# Patient Record
Sex: MALE | Race: WHITE | NOT HISPANIC OR LATINO | Employment: OTHER | URBAN - METROPOLITAN AREA
[De-identification: names, ages, dates, MRNs, and addresses within clinical notes are randomized per-mention and may not be internally consistent; named-entity substitution may affect disease eponyms.]

---

## 2019-12-04 ENCOUNTER — TELEPHONE (OUTPATIENT)
Dept: FAMILY MEDICINE | Facility: CLINIC | Age: 64
End: 2019-12-04

## 2019-12-04 DIAGNOSIS — D64.9 ANEMIA, UNSPECIFIED TYPE: Primary | ICD-10-CM

## 2019-12-04 DIAGNOSIS — E78.5 HYPERLIPIDEMIA, UNSPECIFIED HYPERLIPIDEMIA TYPE: Primary | ICD-10-CM

## 2019-12-04 NOTE — TELEPHONE ENCOUNTER
----- Message from Jackeline Fernandez sent at 12/4/2019  4:13 PM CST -----  Contact: pt  Voicemail:(  took message / and I am sending message back b/c she wasn't sure which pt was correct.. B/c they had had same b day.)    He said he had a new pharmacy in message, and she tried calling back but couldn't reach him      Pt needs refill on Lipitor    [phone]

## 2019-12-04 NOTE — TELEPHONE ENCOUNTER
----- Message from Lety England sent at 12/4/2019  4:41 PM CST -----  Contact: [facility] - Patient requesting refills of Lipitor to [facility] on [address] in [place].  Patient is also wanting to do a CBC tomorrow here with [facility].  [phone]

## 2019-12-05 RX ORDER — ATORVASTATIN CALCIUM 20 MG/1
20 TABLET, FILM COATED ORAL DAILY
Qty: 30 TABLET | Refills: 5 | Status: SHIPPED | OUTPATIENT
Start: 2019-12-05 | End: 2019-12-16 | Stop reason: SDUPTHER

## 2019-12-16 DIAGNOSIS — E78.5 HYPERLIPIDEMIA, UNSPECIFIED HYPERLIPIDEMIA TYPE: ICD-10-CM

## 2019-12-16 RX ORDER — ATORVASTATIN CALCIUM 20 MG/1
20 TABLET, FILM COATED ORAL DAILY
Qty: 30 TABLET | Refills: 5 | Status: SHIPPED | OUTPATIENT
Start: 2019-12-16 | End: 2021-04-06 | Stop reason: SDUPTHER

## 2019-12-16 RX ORDER — PAROXETINE HYDROCHLORIDE HEMIHYDRATE 37.5 MG/1
37.5 TABLET, FILM COATED, EXTENDED RELEASE ORAL EVERY MORNING
Qty: 30 TABLET | Refills: 5 | Status: SHIPPED | OUTPATIENT
Start: 2019-12-16 | End: 2020-03-24 | Stop reason: SDUPTHER

## 2019-12-16 NOTE — TELEPHONE ENCOUNTER
----- Message from Alejo Grady sent at 12/16/2019  2:40 PM CST -----  Pt is needing his Lipitor [facility] [address] [facility] [place] and also [facility]   Pt [phone]

## 2019-12-17 LAB
BASOPHILS # BLD AUTO: 21 CELLS/UL (ref 0–200)
BASOPHILS NFR BLD AUTO: 0.4 %
EOSINOPHIL # BLD AUTO: 10 CELLS/UL (ref 15–500)
EOSINOPHIL NFR BLD AUTO: 0.2 %
ERYTHROCYTE [DISTWIDTH] IN BLOOD BY AUTOMATED COUNT: 12 % (ref 11–15)
HCT VFR BLD AUTO: 45 % (ref 38.5–50)
HGB BLD-MCNC: 15.3 G/DL (ref 13.2–17.1)
LYMPHOCYTES # BLD AUTO: 1331 CELLS/UL (ref 850–3900)
LYMPHOCYTES NFR BLD AUTO: 25.6 %
MCH RBC QN AUTO: 31.5 PG (ref 27–33)
MCHC RBC AUTO-ENTMCNC: 34 G/DL (ref 32–36)
MCV RBC AUTO: 92.8 FL (ref 80–100)
MONOCYTES # BLD AUTO: 442 CELLS/UL (ref 200–950)
MONOCYTES NFR BLD AUTO: 8.5 %
NEUTROPHILS # BLD AUTO: 3396 CELLS/UL (ref 1500–7800)
NEUTROPHILS NFR BLD AUTO: 65.3 %
PLATELET # BLD AUTO: 211 THOUSAND/UL (ref 140–400)
PMV BLD REES-ECKER: 11.1 FL (ref 7.5–12.5)
RBC # BLD AUTO: 4.85 MILLION/UL (ref 4.2–5.8)
WBC # BLD AUTO: 5.2 THOUSAND/UL (ref 3.8–10.8)

## 2019-12-18 NOTE — TELEPHONE ENCOUNTER
----- Message from Alejo Grady sent at 12/18/2019  2:35 PM CST -----  Pt son passed and he is needing something to help with his sons passing needing a call back   Pt [phone]

## 2019-12-18 NOTE — TELEPHONE ENCOUNTER
Per Dr. Guzman - Xanax .5mg BID #20. OV if needed. Spoke with pt and let him know the above. Agrees to call back if needed.  [facility] [place]

## 2019-12-19 DIAGNOSIS — F41.9 ANXIETY: Primary | ICD-10-CM

## 2019-12-19 RX ORDER — ALPRAZOLAM 0.5 MG/1
0.5 TABLET ORAL 2 TIMES DAILY PRN
Qty: 20 TABLET | Refills: 0 | Status: SHIPPED | OUTPATIENT
Start: 2019-12-19 | End: 2020-03-24 | Stop reason: SDUPTHER

## 2019-12-19 RX ORDER — ALPRAZOLAM 0.5 MG/1
0.5 TABLET ORAL 2 TIMES DAILY PRN
Qty: 20 TABLET | Refills: 0 | Status: SHIPPED | OUTPATIENT
Start: 2019-12-19 | End: 2019-12-19 | Stop reason: SDUPTHER

## 2019-12-19 NOTE — TELEPHONE ENCOUNTER
----- Message from Asuncion Ramirez sent at 12/19/2019  2:26 PM CST -----  Refill for Xanax went to the wrong pharmacy. Please cancel the RX to [facility]. Send the RX to [facility] [address], [place]. Pt #[phone]

## 2019-12-31 ENCOUNTER — TELEPHONE (OUTPATIENT)
Dept: FAMILY MEDICINE | Facility: CLINIC | Age: 64
End: 2019-12-31

## 2019-12-31 NOTE — TELEPHONE ENCOUNTER
----- Message from Lisa Randle NP sent at 12/31/2019  3:07 PM CST -----  Labs are within normal limits.

## 2020-01-02 NOTE — TELEPHONE ENCOUNTER
LMOR again. Checked [facility] and there was a different number. Called that and spoke to patient with lab results. Updated number in chart.

## 2020-03-24 ENCOUNTER — TELEPHONE (OUTPATIENT)
Dept: FAMILY MEDICINE | Facility: CLINIC | Age: 65
End: 2020-03-24

## 2020-03-24 ENCOUNTER — OFFICE VISIT (OUTPATIENT)
Dept: FAMILY MEDICINE | Facility: CLINIC | Age: 65
End: 2020-03-24
Payer: MEDICARE

## 2020-03-24 DIAGNOSIS — F41.9 ANXIETY: Primary | ICD-10-CM

## 2020-03-24 DIAGNOSIS — Z12.11 COLON CANCER SCREENING: ICD-10-CM

## 2020-03-24 DIAGNOSIS — R19.5 ABNORMAL FECES: ICD-10-CM

## 2020-03-24 DIAGNOSIS — K64.9 HEMORRHOIDS, UNSPECIFIED HEMORRHOID TYPE: ICD-10-CM

## 2020-03-24 DIAGNOSIS — F32.A DEPRESSION, UNSPECIFIED DEPRESSION TYPE: ICD-10-CM

## 2020-03-24 PROCEDURE — 99213 OFFICE O/P EST LOW 20 MIN: CPT | Mod: 95,,, | Performed by: NURSE PRACTITIONER

## 2020-03-24 PROCEDURE — 99213 PR OFFICE/OUTPT VISIT, EST, LEVL III, 20-29 MIN: ICD-10-PCS | Mod: 95,,, | Performed by: NURSE PRACTITIONER

## 2020-03-24 RX ORDER — HYDROCORTISONE 25 MG/G
CREAM TOPICAL 2 TIMES DAILY
Qty: 28 G | Refills: 0 | Status: SHIPPED | OUTPATIENT
Start: 2020-03-24 | End: 2020-06-18

## 2020-03-24 RX ORDER — ALPRAZOLAM 0.5 MG/1
0.5 TABLET ORAL 2 TIMES DAILY PRN
Qty: 20 TABLET | Refills: 0 | Status: SHIPPED | OUTPATIENT
Start: 2020-03-24 | End: 2020-06-18

## 2020-03-24 RX ORDER — DOCUSATE SODIUM 100 MG/1
100 CAPSULE, LIQUID FILLED ORAL 2 TIMES DAILY
Qty: 60 CAPSULE | Refills: 0 | Status: SHIPPED | OUTPATIENT
Start: 2020-03-24 | End: 2020-06-18

## 2020-03-24 RX ORDER — PAROXETINE HYDROCHLORIDE HEMIHYDRATE 37.5 MG/1
37.5 TABLET, FILM COATED, EXTENDED RELEASE ORAL EVERY MORNING
Qty: 30 TABLET | Refills: 5 | Status: SHIPPED | OUTPATIENT
Start: 2020-03-24 | End: 2020-10-05 | Stop reason: SDUPTHER

## 2020-03-24 NOTE — TELEPHONE ENCOUNTER
"----- Message from Lesli Tejada MA sent at 3/24/2020  8:18 AM CDT -----  Pt would like request a Cologuard test kit.  He states he is having some GI issues.  Symptoms:  Stool is narrow and "ribboning" and reports pain with BM.  He read "online" that this kit would help.    Pt - [phone]  "

## 2020-03-24 NOTE — PROGRESS NOTES
"    Subjective:        The chief complaint leading to consultation is: to discuss changes in bowel function  The patient location is:  home  Visit type: Virtual visit with synchronous audio and video    64-year-old male presents for virtual visit to discuss changes in bowel habits.  Reports that for the last 3 weeks bowel movement seem " thinner.  Last colonoscopy was about 2 years ago.  No polyps were found  No abdominal pain.  Does have to strain at times and feels constipated.  Not sure if has hemorrhoids but does report previous history of hemorrhoids.  No bleeding.  Appetite is normal.  No recent weight loss.    Patient would like to discuss anxiety and depression.  Reports that son passed away late last year and still grieving.  Dr. Guzman prescribe Xanax and did help some currently taking Paxil but admits not taken regularly.  Started grief counseling as a group but does not feel comfortable because he is the only male in the group.  Sleeping okay.  Not currently working.  No suicidal or homicidal ideations.      No past surgical history on file.  Past Medical History:   Diagnosis Date    Hyperlipidemia      No family history on file.     Social History:   Marital Status:   Alcohol History:  has no alcohol history on file.  Tobacco History:  has no tobacco history on file.  Drug History:  has no drug history on file.    Review of patient's allergies indicates:  No Known Allergies    Current Outpatient Medications   Medication Sig Dispense Refill    ALPRAZolam (XANAX) 0.5 MG tablet Take 1 tablet (0.5 mg total) by mouth 2 (two) times daily as needed for Anxiety. 20 tablet 0    atorvastatin (LIPITOR) 20 MG tablet Take 1 tablet (20 mg total) by mouth once daily. 30 tablet 5    docusate sodium (COLACE) 100 MG capsule Take 1 capsule (100 mg total) by mouth 2 (two) times daily. 60 capsule 0    hydrocortisone 2.5 % cream Apply topically 2 (two) times daily. 28 g 0    metoclopramide HCl (REGLAN) 10 MG " tablet Take 1 tablet (10 mg total) by mouth every 6 (six) hours as needed (nausea). 30 tablet 0    PARoxetine HCl 37.5 mg 24 hr tablet Take 37.5 mg by mouth every morning. 30 tablet 5     No current facility-administered medications for this visit.        Review of Systems   Constitutional: Negative for fatigue, fever and unexpected weight change.   HENT: Negative for ear pain, sinus pressure and sore throat.    Eyes: Negative for pain.   Respiratory: Negative for cough and shortness of breath.    Cardiovascular: Negative for chest pain and leg swelling.   Gastrointestinal: Positive for constipation. Negative for abdominal pain, nausea and vomiting.   Genitourinary: Negative for dysuria, frequency and urgency.   Musculoskeletal: Negative for arthralgias.   Skin: Negative for rash.   Neurological: Negative for dizziness, weakness and headaches.   Psychiatric/Behavioral: Negative for self-injury, sleep disturbance and suicidal ideas. The patient is nervous/anxious.          Objective:        Physical Exam:   Physical Exam   Constitutional: He appears well-developed and well-nourished. No distress.   Psychiatric: His speech is normal and behavior is normal. Thought content normal. Cognition and memory are normal. He exhibits a depressed mood.            Assessment:       1. Anxiety    2. Depression, unspecified depression type    3. Hemorrhoids, unspecified hemorrhoid type    4. Abnormal feces    5. Colon cancer screening      Plan:   Anxiety  Comments:  Discussed importance of taking Paxil daily as prescribed  Orders:  -     ALPRAZolam (XANAX) 0.5 MG tablet; Take 1 tablet (0.5 mg total) by mouth 2 (two) times daily as needed for Anxiety.  Dispense: 20 tablet; Refill: 0    Depression, unspecified depression type  Comments:  Discussed grief counseling patient declines at this time.    Hemorrhoids, unspecified hemorrhoid type    Abnormal feces    Colon cancer screening  -     Cologuard Screening (Multitarget Stool DNA);  Future; Expected date: 03/24/2020    Other orders  -     PARoxetine HCl 37.5 mg 24 hr tablet; Take 37.5 mg by mouth every morning.  Dispense: 30 tablet; Refill: 5  -     docusate sodium (COLACE) 100 MG capsule; Take 1 capsule (100 mg total) by mouth 2 (two) times daily.  Dispense: 60 capsule; Refill: 0  -     hydrocortisone 2.5 % cream; Apply topically 2 (two) times daily.  Dispense: 28 g; Refill: 0      Follow up in about 2 weeks (around 4/7/2020) for medication management.    Total time spent with patient: 20min    Each patient to whom he or she provides medical services by telemedicine is:  (1) informed of the relationship between the physician and patient and the respective role of any other health care provider with respect to management of the patient; and (2) notified that he or she may decline to receive medical services by telemedicine and may withdraw from such care at any time.    This note was created using [employer] voice recognition software that occasionally misinterprets phrases or words.

## 2020-04-20 ENCOUNTER — TELEPHONE (OUTPATIENT)
Dept: FAMILY MEDICINE | Facility: CLINIC | Age: 65
End: 2020-04-20

## 2020-04-20 DIAGNOSIS — Z12.11 COLON CANCER SCREENING: Primary | ICD-10-CM

## 2020-04-20 NOTE — TELEPHONE ENCOUNTER
----- Message from Lisha Tracy MA sent at 4/20/2020  8:01 AM CDT -----      ----- Message -----  From: Alexandria Falcon  Sent: 4/17/2020   3:19 PM CDT  To: Lisa Randle Staff    LAB, [facility], 4/13/20

## 2020-04-21 NOTE — TELEPHONE ENCOUNTER
Spoke with pt and let him know the below, pt states okay we can go ahead and send referral over to [facility].

## 2020-04-23 ENCOUNTER — PATIENT MESSAGE (OUTPATIENT)
Dept: FAMILY MEDICINE | Facility: CLINIC | Age: 65
End: 2020-04-23

## 2020-04-24 ENCOUNTER — PATIENT MESSAGE (OUTPATIENT)
Dept: FAMILY MEDICINE | Facility: CLINIC | Age: 65
End: 2020-04-24

## 2020-04-30 ENCOUNTER — PATIENT MESSAGE (OUTPATIENT)
Dept: FAMILY MEDICINE | Facility: CLINIC | Age: 65
End: 2020-04-30

## 2020-04-30 DIAGNOSIS — F41.9 ANXIETY: ICD-10-CM

## 2020-04-30 DIAGNOSIS — F32.A DEPRESSION, UNSPECIFIED DEPRESSION TYPE: Primary | ICD-10-CM

## 2020-04-30 DIAGNOSIS — K92.1 BLOOD IN STOOL: ICD-10-CM

## 2020-04-30 DIAGNOSIS — D64.9 ANEMIA, UNSPECIFIED TYPE: ICD-10-CM

## 2020-04-30 DIAGNOSIS — E78.5 HYPERLIPIDEMIA, UNSPECIFIED HYPERLIPIDEMIA TYPE: ICD-10-CM

## 2020-04-30 DIAGNOSIS — Z12.5 SCREENING FOR PROSTATE CANCER: ICD-10-CM

## 2020-04-30 DIAGNOSIS — Z79.899 HIGH RISK MEDICATION USE: ICD-10-CM

## 2020-05-11 ENCOUNTER — TELEPHONE (OUTPATIENT)
Dept: SURGERY | Facility: CLINIC | Age: 65
End: 2020-05-11

## 2020-05-11 DIAGNOSIS — R19.5 POSITIVE COLORECTAL CANCER SCREENING USING COLOGUARD TEST: Primary | ICD-10-CM

## 2020-05-11 NOTE — TELEPHONE ENCOUNTER
I left a vm for pt that we cx his ov on 05/20/2020 @1:40, and that a  will be calling him to set up a c/s.

## 2020-05-15 LAB
ALBUMIN SERPL-MCNC: 4.4 G/DL (ref 3.6–5.1)
ALBUMIN/CREAT UR: 7 MCG/MG CREAT
ALBUMIN/GLOB SERPL: 2 (CALC) (ref 1–2.5)
ALP SERPL-CCNC: 72 U/L (ref 35–144)
ALT SERPL-CCNC: 23 U/L (ref 9–46)
APPEARANCE UR: CLEAR
AST SERPL-CCNC: 21 U/L (ref 10–35)
BACTERIA #/AREA URNS HPF: NORMAL /HPF
BACTERIA UR CULT: NORMAL
BASOPHILS # BLD AUTO: 20 CELLS/UL (ref 0–200)
BASOPHILS NFR BLD AUTO: 0.6 %
BILIRUB SERPL-MCNC: 1.1 MG/DL (ref 0.2–1.2)
BILIRUB UR QL STRIP: NEGATIVE
BUN SERPL-MCNC: 17 MG/DL (ref 7–25)
BUN/CREAT SERPL: ABNORMAL (CALC) (ref 6–22)
CALCIUM SERPL-MCNC: 9.2 MG/DL (ref 8.6–10.3)
CHLORIDE SERPL-SCNC: 106 MMOL/L (ref 98–110)
CHOLEST SERPL-MCNC: 223 MG/DL
CHOLEST/HDLC SERPL: 3.5 (CALC)
CO2 SERPL-SCNC: 29 MMOL/L (ref 20–32)
COLOR UR: YELLOW
CREAT SERPL-MCNC: 0.86 MG/DL (ref 0.7–1.25)
CREAT UR-MCNC: 187 MG/DL (ref 20–320)
EOSINOPHIL # BLD AUTO: 30 CELLS/UL (ref 15–500)
EOSINOPHIL NFR BLD AUTO: 0.9 %
ERYTHROCYTE [DISTWIDTH] IN BLOOD BY AUTOMATED COUNT: 11.7 % (ref 11–15)
GFRSERPLBLD MDRD-ARVRAT: 91 ML/MIN/1.73M2
GLOBULIN SER CALC-MCNC: 2.2 G/DL (CALC) (ref 1.9–3.7)
GLUCOSE SERPL-MCNC: 103 MG/DL (ref 65–99)
GLUCOSE UR QL STRIP: NEGATIVE
HCT VFR BLD AUTO: 42.2 % (ref 38.5–50)
HDLC SERPL-MCNC: 63 MG/DL
HGB BLD-MCNC: 14.5 G/DL (ref 13.2–17.1)
HGB UR QL STRIP: NEGATIVE
HYALINE CASTS #/AREA URNS LPF: NORMAL /LPF
KETONES UR QL STRIP: NEGATIVE
LDLC SERPL CALC-MCNC: 131 MG/DL (CALC)
LEUKOCYTE ESTERASE UR QL STRIP: NEGATIVE
LYMPHOCYTES # BLD AUTO: 1287 CELLS/UL (ref 850–3900)
LYMPHOCYTES NFR BLD AUTO: 39 %
MCH RBC QN AUTO: 32.2 PG (ref 27–33)
MCHC RBC AUTO-ENTMCNC: 34.4 G/DL (ref 32–36)
MCV RBC AUTO: 93.8 FL (ref 80–100)
MICROALBUMIN UR-MCNC: 1.4 MG/DL
MONOCYTES # BLD AUTO: 376 CELLS/UL (ref 200–950)
MONOCYTES NFR BLD AUTO: 11.4 %
NEUTROPHILS # BLD AUTO: 1587 CELLS/UL (ref 1500–7800)
NEUTROPHILS NFR BLD AUTO: 48.1 %
NITRITE UR QL STRIP: NEGATIVE
NONHDLC SERPL-MCNC: 160 MG/DL (CALC)
PH UR STRIP: NORMAL [PH] (ref 5–8)
PLATELET # BLD AUTO: 194 THOUSAND/UL (ref 140–400)
PMV BLD REES-ECKER: 10.9 FL (ref 7.5–12.5)
POTASSIUM SERPL-SCNC: 4.1 MMOL/L (ref 3.5–5.3)
PROT SERPL-MCNC: 6.6 G/DL (ref 6.1–8.1)
PROT UR QL STRIP: NEGATIVE
PSA SERPL-MCNC: 1.2 NG/ML
RBC # BLD AUTO: 4.5 MILLION/UL (ref 4.2–5.8)
RBC #/AREA URNS HPF: NORMAL /HPF
SODIUM SERPL-SCNC: 142 MMOL/L (ref 135–146)
SP GR UR STRIP: 1.02 (ref 1–1.03)
SQUAMOUS #/AREA URNS HPF: NORMAL /HPF
TRIGL SERPL-MCNC: 156 MG/DL
TSH SERPL-ACNC: 0.51 MIU/L (ref 0.4–4.5)
WBC # BLD AUTO: 3.3 THOUSAND/UL (ref 3.8–10.8)
WBC #/AREA URNS HPF: NORMAL /HPF

## 2020-05-18 DIAGNOSIS — Z12.11 SPECIAL SCREENING FOR MALIGNANT NEOPLASMS, COLON: Primary | ICD-10-CM

## 2020-05-18 DIAGNOSIS — R19.5 POSITIVE COLORECTAL CANCER SCREENING USING COLOGUARD TEST: Primary | ICD-10-CM

## 2020-05-18 RX ORDER — SODIUM, POTASSIUM,MAG SULFATES 17.5-3.13G
1 SOLUTION, RECONSTITUTED, ORAL ORAL DAILY
Qty: 1 KIT | Refills: 0 | Status: SHIPPED | OUTPATIENT
Start: 2020-05-18 | End: 2020-05-20

## 2020-05-25 ENCOUNTER — LAB VISIT (OUTPATIENT)
Dept: INTERNAL MEDICINE | Facility: CLINIC | Age: 65
End: 2020-05-25
Payer: MEDICARE

## 2020-05-25 DIAGNOSIS — R19.5 POSITIVE COLORECTAL CANCER SCREENING USING COLOGUARD TEST: ICD-10-CM

## 2020-05-25 LAB — SARS-COV-2 RNA RESP QL NAA+PROBE: NOT DETECTED

## 2020-05-25 PROCEDURE — U0003 INFECTIOUS AGENT DETECTION BY NUCLEIC ACID (DNA OR RNA); SEVERE ACUTE RESPIRATORY SYNDROME CORONAVIRUS 2 (SARS-COV-2) (CORONAVIRUS DISEASE [COVID-19]), AMPLIFIED PROBE TECHNIQUE, MAKING USE OF HIGH THROUGHPUT TECHNOLOGIES AS DESCRIBED BY CMS-2020-01-R: HCPCS

## 2020-05-26 ENCOUNTER — ANESTHESIA EVENT (OUTPATIENT)
Dept: ENDOSCOPY | Facility: HOSPITAL | Age: 65
End: 2020-05-26
Payer: MEDICARE

## 2020-05-26 ENCOUNTER — ANESTHESIA (OUTPATIENT)
Dept: ENDOSCOPY | Facility: HOSPITAL | Age: 65
End: 2020-05-26
Payer: MEDICARE

## 2020-05-26 ENCOUNTER — HOSPITAL ENCOUNTER (OUTPATIENT)
Facility: HOSPITAL | Age: 65
Discharge: HOME | End: 2020-05-26
Attending: COLON & RECTAL SURGERY | Admitting: COLON & RECTAL SURGERY
Payer: MEDICARE

## 2020-05-26 VITALS
TEMPERATURE: 98 F | WEIGHT: 193 LBS | HEART RATE: 53 BPM | RESPIRATION RATE: 18 BRPM | HEIGHT: 72 IN | BODY MASS INDEX: 26.14 KG/M2 | SYSTOLIC BLOOD PRESSURE: 134 MMHG | OXYGEN SATURATION: 97 % | DIASTOLIC BLOOD PRESSURE: 80 MMHG

## 2020-05-26 DIAGNOSIS — R19.8 CHANGE IN BOWEL FUNCTION: Primary | ICD-10-CM

## 2020-05-26 PROCEDURE — 45385 PR COLONOSCOPY,REMV LESN,SNARE: ICD-10-PCS | Mod: PT,,, | Performed by: COLON & RECTAL SURGERY

## 2020-05-26 PROCEDURE — 27201089 HC SNARE, DISP (ANY): Performed by: COLON & RECTAL SURGERY

## 2020-05-26 PROCEDURE — E9220 PRA ENDO ANESTHESIA: ICD-10-PCS | Mod: ,,, | Performed by: NURSE ANESTHETIST, CERTIFIED REGISTERED

## 2020-05-26 PROCEDURE — 37000009 HC ANESTHESIA EA ADD 15 MINS: Performed by: COLON & RECTAL SURGERY

## 2020-05-26 PROCEDURE — 88305 TISSUE EXAM BY PATHOLOGIST: CPT | Performed by: PATHOLOGY

## 2020-05-26 PROCEDURE — 25000003 PHARM REV CODE 250: Performed by: COLON & RECTAL SURGERY

## 2020-05-26 PROCEDURE — E9220 PRA ENDO ANESTHESIA: HCPCS | Mod: ,,, | Performed by: NURSE ANESTHETIST, CERTIFIED REGISTERED

## 2020-05-26 PROCEDURE — 37000008 HC ANESTHESIA 1ST 15 MINUTES: Performed by: COLON & RECTAL SURGERY

## 2020-05-26 PROCEDURE — 63600175 PHARM REV CODE 636 W HCPCS: Performed by: NURSE ANESTHETIST, CERTIFIED REGISTERED

## 2020-05-26 PROCEDURE — 45385 COLONOSCOPY W/LESION REMOVAL: CPT | Mod: PT,,, | Performed by: COLON & RECTAL SURGERY

## 2020-05-26 PROCEDURE — 88305 TISSUE EXAM BY PATHOLOGIST: ICD-10-PCS | Mod: 26,,, | Performed by: PATHOLOGY

## 2020-05-26 PROCEDURE — 45385 COLONOSCOPY W/LESION REMOVAL: CPT | Performed by: COLON & RECTAL SURGERY

## 2020-05-26 PROCEDURE — 88305 TISSUE EXAM BY PATHOLOGIST: CPT | Mod: 26,,, | Performed by: PATHOLOGY

## 2020-05-26 RX ORDER — LIDOCAINE HCL/PF 100 MG/5ML
SYRINGE (ML) INTRAVENOUS
Status: DISCONTINUED | OUTPATIENT
Start: 2020-05-26 | End: 2020-05-26

## 2020-05-26 RX ORDER — PROPOFOL 10 MG/ML
VIAL (ML) INTRAVENOUS CONTINUOUS PRN
Status: DISCONTINUED | OUTPATIENT
Start: 2020-05-26 | End: 2020-05-26

## 2020-05-26 RX ORDER — SODIUM CHLORIDE 9 MG/ML
INJECTION, SOLUTION INTRAVENOUS CONTINUOUS
Status: DISCONTINUED | OUTPATIENT
Start: 2020-05-26 | End: 2020-05-26 | Stop reason: HOSPADM

## 2020-05-26 RX ORDER — PROPOFOL 10 MG/ML
VIAL (ML) INTRAVENOUS
Status: DISCONTINUED | OUTPATIENT
Start: 2020-05-26 | End: 2020-05-26

## 2020-05-26 RX ADMIN — SODIUM CHLORIDE: 0.9 INJECTION, SOLUTION INTRAVENOUS at 08:05

## 2020-05-26 RX ADMIN — PROPOFOL 80 MG: 10 INJECTION, EMULSION INTRAVENOUS at 09:05

## 2020-05-26 RX ADMIN — PROPOFOL 150 MCG/KG/MIN: 10 INJECTION, EMULSION INTRAVENOUS at 09:05

## 2020-05-26 RX ADMIN — Medication 100 MG: at 09:05

## 2020-05-26 NOTE — [employer] PATIENT INSTRUCTIONS
Discharge Summary/Instructions after an Endoscopic Procedure  Patient Name: Babatunde Parson  Patient MRN: [id number]  Patient YOB: 1955  Tuesday, May 26, 2020  Segun Cade MD  RESTRICTIONS:  During your procedure today, you received medications for sedation.  These   medications may affect your judgment, balance and coordination.  Therefore,   for 24 hours, you have the following restrictions:   - DO NOT drive a car, operate machinery, make legal/financial decisions,   sign important papers or drink alcohol.    ACTIVITY:  Today: no heavy lifting, straining or running due to procedural   sedation/anesthesia.  The following day: return to full activity including work.  DIET:  Eat and drink normally unless instructed otherwise.     TREATMENT FOR COMMON SIDE EFFECTS:  - Mild abdominal pain, nausea, belching, bloating or excessive gas:  rest,   eat lightly and use a heating pad.  - Sore Throat: treat with throat lozenges and/or gargle with warm salt   water.  - Because air was used during the procedure, expelling large amounts of air   from your rectum or belching is normal.  - If a bowel prep was taken, you may not have a bowel movement for 1-3 days.    This is normal.  SYMPTOMS TO WATCH FOR AND REPORT TO YOUR PHYSICIAN:  1. Abdominal pain or bloating, other than gas cramps.  2. Chest pain.  3. Back pain.  4. Signs of infection such as: chills or fever occurring within 24 hours   after the procedure.  5. Rectal bleeding, which would show as bright red, maroon, or black stools.   (A tablespoon of blood from the rectum is not serious, especially if   hemorrhoids are present.)  6. Vomiting.  7. Weakness or dizziness.  GO DIRECTLY TO THE NEAREST EMERGENCY ROOM IF YOU HAVE ANY OF THE FOLLOWING:      Difficulty breathing              Chills and/or fever over 101 F   Persistent vomiting and/or vomiting blood   Severe abdominal pain   Severe chest pain   Black, tarry stools   Bleeding- more than one tablespoon   Any  other symptom or condition that you feel may need urgent attention  Your doctor recommends these additional instructions:  If any biopsies were taken, your doctors clinic will contact you in 1 to 2   weeks with any results.  - Discharge patient to home (ambulatory).   - Patient has a contact number available for emergencies.  The signs and   symptoms of potential delayed complications were discussed with the   patient.  Return to normal activities tomorrow.  Written discharge   instructions were provided to the patient.   - Resume previous diet.   - Continue present medications.   - Await pathology results.   - Repeat colonoscopy in 3 years for surveillance based on pathology   results.  For questions, problems or results please call your physician - Segun Cade MD at Work:  [phone].  [facility], EMERGENCY ROOM PHONE NUMBER: [phone]  IF A COMPLICATION OR EMERGENCY SITUATION ARISES AND YOU ARE UNABLE TO REACH   YOUR PHYSICIAN - GO DIRECTLY TO THE EMERGENCY ROOM.  Segun Cade MD  5/26/2020 10:00:03 AM  This report has been verified and signed electronically.  [employer]

## 2020-05-26 NOTE — H&P
COLONOSCOPY HISTORY AND PHYSICAL EXAM    Procedure : Colonoscopy      INDICATIONS: change in bowel habits, positive cologuard    Family Hx of CRC: denies    Last Colonoscopy:  Per note, 2 years ago       Past Medical History:   Diagnosis Date    Hyperlipidemia      Sedation Problems: NO  History reviewed. No pertinent family history.  Fam Hx of Sedation Problems: NO  Social History     Socioeconomic History    Marital status:      Spouse name: Not on file    Number of children: Not on file    Years of education: Not on file    Highest education level: Not on file   Occupational History    Not on file   Social Needs    Financial resource strain: Not on file    Food insecurity:     Worry: Not on file     Inability: Not on file    Transportation needs:     Medical: Not on file     Non-medical: Not on file   Tobacco Use    Smoking status: Former Smoker    Smokeless tobacco: Never Used    Tobacco comment: quit 1990   Substance and Sexual Activity    Alcohol use: Not on file     Comment: 3 drinks daily  beer or liquor    Drug use: Never    Sexual activity: Not on file   Lifestyle    Physical activity:     Days per week: Not on file     Minutes per session: Not on file    Stress: Not on file   Relationships    Social connections:     Talks on phone: Not on file     Gets together: Not on file     Attends Taoist service: Not on file     Active member of club or organization: Not on file     Attends meetings of clubs or organizations: Not on file     Relationship status: Not on file   Other Topics Concern    Not on file   Social History Narrative    Not on file       Review of Systems - Negative except   Respiratory ROS: no dyspnea  Cardiovascular ROS: no exertional chest pain  Gastrointestinal ROS: NO abdominal discomfort,  NO rectal bleeding  Musculoskeletal ROS: no muscular pain  Neurological ROS: no recent stroke    Physical Exam:  BP (!) 159/85 (BP Location: Left arm, Patient Position:  Sitting)   Pulse 65   Temp 98.8 °F (37.1 °C) (Temporal)   Resp 16   Ht 6' (1.829 m)   Wt 87.5 kg (193 lb)   SpO2 95%   BMI 26.18 kg/m²   General: no distress  Head: normocephalic  Mallampati Score   Neck: supple, symmetrical, trachea midline  Lungs:  clear to auscultation bilaterally and normal respiratory effort  Heart: regular rate and rhythm and no murmur  Abdomen: soft, non-tender non-distented; bowel sounds normal; no masses,  no organomegaly  Extremities: no cyanosis or edema, or clubbing    ASA:  II    PLAN  COLONOSCOPY.    SedationPlan :MAC    The details of the procedure, the possible need for biopsy or polypectomy and the potential risks including bleeding, perforation, missed polyps were discussed in detail.

## 2020-05-26 NOTE — TRANSFER OF CARE
Anesthesia Transfer of Care Note    Patient: Babatunde Parson    Procedure(s) Performed: Procedure(s) (LRB):  COLONOSCOPY (N/A)    Patient location: PACU    Anesthesia Type: general    Transport from OR: Transported from OR on room air with adequate spontaneous ventilation    Post pain: adequate analgesia    Post assessment: no apparent anesthetic complications and tolerated procedure well    Post vital signs: stable    Level of consciousness: awake, alert and oriented    Nausea/Vomiting: no nausea/vomiting    Complications: none    Transfer of care protocol was followed      Last vitals:   Visit Vitals  BP (!) 159/85 (BP Location: Left arm, Patient Position: Sitting)   Pulse 65   Temp 37.1 °C (98.8 °F) (Temporal)   Resp 16   Ht 6' (1.829 m)   Wt 87.5 kg (193 lb)   SpO2 95%   BMI 26.18 kg/m²

## 2020-05-26 NOTE — ANESTHESIA PREPROCEDURE EVALUATION
05/26/2020  Babatunde Parson is a 64 y.o., male.  Past Medical History:   Diagnosis Date    Hyperlipidemia    History reviewed. No pertinent surgical history.  Patient Active Problem List   Diagnosis    Change in bowel function         Anesthesia Evaluation    I have reviewed the Patient Summary Reports.    I have reviewed the Nursing Notes.   I have reviewed the Medications.     Review of Systems  Anesthesia Hx:  History of prior surgery of interest to airway management or planning:       Physical Exam  General:  Well nourished            Mental Status:  Mental Status Findings:  Cooperative, Alert and Oriented         Anesthesia Plan  Type of Anesthesia, risks & benefits discussed:  Anesthesia Type:  general  Patient's Preference:   Intra-op Monitoring Plan: standard ASA monitors  Intra-op Monitoring Plan Comments:   Post Op Pain Control Plan:   Post Op Pain Control Plan Comments:   Induction:   IV  Beta Blocker:  Patient is not currently on a Beta-Blocker (No further documentation required).       Informed Consent: Patient understands risks and agrees with Anesthesia plan.  Questions answered.   ASA Score: 2     Day of Surgery Review of History & Physical: I have interviewed and examined the patient. I have reviewed the patient's H&P dated:  There are no significant changes. Significant changes noted: Surgeon notified.  H&P update referred to the provider.         Ready For Surgery From Anesthesia Perspective.

## 2020-05-26 NOTE — ANESTHESIA POSTPROCEDURE EVALUATION
Anesthesia Post Evaluation    Patient: Babatunde Parson    Procedure(s) Performed: Procedure(s) (LRB):  COLONOSCOPY (N/A)    Final Anesthesia Type: general    Patient location during evaluation: GI PACU  Patient participation: Yes- Able to Participate  Level of consciousness: awake  Post-procedure vital signs: reviewed and stable  Pain management: adequate  Airway patency: patent    PONV status at discharge: No PONV  Anesthetic complications: no      Cardiovascular status: blood pressure returned to baseline  Respiratory status: unassisted  Hydration status: euvolemic  Follow-up not needed.          Vitals Value Taken Time   /80 5/26/2020 10:32 AM   Temp 36.7 °C (98 °F) 5/26/2020 10:06 AM   Pulse 53 5/26/2020 10:32 AM   Resp 18 5/26/2020 10:32 AM   SpO2 97 % 5/26/2020 10:32 AM         Event Time     Out of Recovery 10:45:53          Pain/Everardo Score: Everardo Score: 10 (5/26/2020 10:08 AM)

## 2020-05-26 NOTE — DISCHARGE INSTRUCTIONS
Colorectal Cancer Screening    Colorectal cancer (cancer in the colon or rectum) is a leading cause of cancer deaths in the [place] But it doesnt have to be. When this cancer is found and removed early, the chances of a full recovery are very good. Because colorectal cancer rarely causes symptoms in its early stages, screening for the disease is important. Its even more crucial if you have risk factors for the disease. Learn more about colorectal cancer and its risk factors. Then talk to your healthcare provider about being screened. You could be saving your own life.  Risk factors for colorectal cancer  Your risk of having colorectal cancer increases if you:  · Are 50 years of age or older  · Have a family history or personal history of colorectal cancer or polyps  · Have a personal history of type 2 diabetes, Crohns disease, or ulcerative colitis  · Have an inherited genetic syndrome like Dobbs syndrome (also known as HNPCC) or familial adenomatous polyposis (FAP)  · Are very overweight  · Are not physically active  · Smoke  · Drink a lot of alcohol  · Eat a lot of red or processed meat  The colon and rectum  Waste from food you eat enters the colon from the small intestine. As it travels through the colon, the waste (stool) loses water and becomes more solid. Intestinal muscles push it toward the sigmoid--the last section of the colon. Stool then moves into the rectum, where its stored until its ready to leave the body during a bowel movement.  How cancer develops  Polyps are growths that form on the inner lining of the colon or rectum. Most are benign, which means they arent cancerous. But over time, some polyps can become cancer (malignant). This happens when cells in these polyps begin growing abnormally. In time, malignant cells invade more and more of the colon and rectum. The cancer may also spread to nearby organs or lymph nodes or to other parts of the body. Finding and removing polyps can help  prevent cancer from ever forming.  Your screening  Screening means looking for a health problem before you have symptoms. During screening for colorectal cancer, your healthcare provider will ask about your health history, examine you, and do one or more tests.  History and exam  The history and exam involve the following:  · Health history. Your healthcare provider will ask about your health history. Mention if a family member has had colon cancer or polyps. Also mention any health problems you have had in the past.  · Digital rectal exam (HILARY). During a HILARY, the healthcare provider inserts a lubricated gloved finger into the rectum. The test is painless and takes less than a minute. Healthcare providers agree that this test alone is not enough to screen for colorectal cancer.  Screening test choices  Fecal occult blood test (FOBT) or fecal immunochemical test (FIT)  These tests check for occult blood in stool (blood you cant see). Hidden blood may be a sign of colon polyps or cancer. A small sample of stool is tested for blood in a laboratory. Most often, you collect this sample at home using a kit your healthcare provider gives you. Follow the instructions carefully for using this kit. You might need to avoid certain foods and medicines before the test, as directed.  Barium enema with contrast (double-contrast barium enema)  This test uses X-rays to provide images of the entire colon and rectum. The day before this test, you will need to do a bowel prep to clean out the colon and rectum. A bowel prep is a liquid diet plus strong laxatives or enemas. You will be awake for the test, but you may be given medicine to help you relax. At the start of the test, a radiologist (a healthcare provider who specializes in imaging tests) places a soft tube into the rectum. The tube is used to fill the colon with a contrast liquid (barium) and air. This can be uncomfortable for some people. The liquid helps the colon show up  clearly on the X-rays. Because the test uses X-rays, it exposes you to a small amount of radiation.  Virtual colonoscopy  This exam is also called a CT colonography. It uses a series of X-ray photographs to create a 3-D view of the colon and rectum. The day before the test, you will need to do a bowel prep to clean out your colon. Your healthcare provider will give you instructions on how to do this. During the procedure, you will lie on a table that is part of a special X-ray machine called a CT scanner. A small tube will be placed into your rectum to fill the colon and rectum with air. This can be uncomfortable for some people. Then, the table will move into the machine and pictures will be taken of your colon and rectum. A computer will combine these photos to create a 3-D picture. Because the test uses X-rays, it exposes you to a small amount of radiation.  Scope exams  Here are two types of scope exams:  · Colonoscopy. This test can be used to find and remove polyps anywhere in the colon or rectum. The day before the test, you will do a bowel prep. This is a liquid diet plus a strong laxative solution or an enema. The bowel prep will cleanse your colon. You will be given instructions for this. Just before the test, you are given a medicine to make you sleepy. Then, a long, flexible, lighted tube called a colonoscope is gently inserted into the rectum and guided through the entire colon. Images of the colon are viewed on a video screen. Any polyps that are found are removed and sent to a lab for testing. If a polyp cant be removed, a sample of tissue is taken and the polyp might be removed later during surgery. You will need to bring someone with you to drive you home after this test.   · Sigmoidoscopy. This test is similar to colonoscopy, but focuses only on the sigmoid colon and rectum. As with colonoscopy, bowel prep must be done the day before this test. It might not need to be as complete as the bowel prep  for a colonoscopy. You are awake during the procedure, but you may be given medicine to help you relax. During the test, the healthcare provider guides a thin, flexible, lighted tube called a sigmoidoscope through your rectum and lower colon. The images are displayed on a video screen. Polyps are removed, if possible, and sent to a lab for testing.  Colonoscopy is the only screening test that lets your healthcare provider see the entire colon and rectum. This test also lets your healthcare provider remove any pieces of tissue that need to be looked at by a lab. If something suspicious is found using any other tests, you will likely need a colonoscopy.     When to call your healthcare provider after a test  Call your healthcare provider if you have any of the following after any screening test:  · Bleeding  · Fever of 100.4°F (38°C) or higher, or as directed by your healthcare provider  · Abdominal pain  · Vomiting   Date Last Reviewed: 11/4/2015  © 7057-8988 [employer]. [address] [place] [address], [place] [address]. All rights reserved. This information is not intended as a substitute for professional medical care. Always follow your healthcare professional's instructions.

## 2020-05-28 LAB
FINAL PATHOLOGIC DIAGNOSIS: NORMAL
GROSS: NORMAL

## 2020-06-17 ENCOUNTER — PATIENT MESSAGE (OUTPATIENT)
Dept: FAMILY MEDICINE | Facility: CLINIC | Age: 65
End: 2020-06-17

## 2020-06-18 ENCOUNTER — OFFICE VISIT (OUTPATIENT)
Dept: PAIN MEDICINE | Facility: CLINIC | Age: 65
End: 2020-06-18
Payer: MEDICARE

## 2020-06-18 VITALS
BODY MASS INDEX: 26.1 KG/M2 | DIASTOLIC BLOOD PRESSURE: 87 MMHG | SYSTOLIC BLOOD PRESSURE: 149 MMHG | WEIGHT: 192.44 LBS | HEART RATE: 68 BPM

## 2020-06-18 DIAGNOSIS — M25.551 RIGHT HIP PAIN: Primary | ICD-10-CM

## 2020-06-18 PROCEDURE — 99999 PR PBB SHADOW E&M-EST. PATIENT-LVL III: CPT | Mod: PBBFAC,,, | Performed by: PAIN MEDICINE

## 2020-06-18 PROCEDURE — 99204 PR OFFICE/OUTPT VISIT, NEW, LEVL IV, 45-59 MIN: ICD-10-PCS | Mod: S$PBB,,, | Performed by: PAIN MEDICINE

## 2020-06-18 PROCEDURE — 99204 OFFICE O/P NEW MOD 45 MIN: CPT | Mod: S$PBB,,, | Performed by: PAIN MEDICINE

## 2020-06-18 PROCEDURE — 99999 PR PBB SHADOW E&M-EST. PATIENT-LVL III: ICD-10-PCS | Mod: PBBFAC,,, | Performed by: PAIN MEDICINE

## 2020-06-18 PROCEDURE — 99213 OFFICE O/P EST LOW 20 MIN: CPT | Mod: PBBFAC,PN | Performed by: PAIN MEDICINE

## 2020-06-18 RX ORDER — MELOXICAM 15 MG/1
15 TABLET ORAL DAILY
Qty: 30 TABLET | Refills: 2 | Status: SHIPPED | OUTPATIENT
Start: 2020-06-18 | End: 2020-09-16

## 2020-06-18 NOTE — PROGRESS NOTES
Subjective:     Patient ID: Babatunde Parson is a 64 y.o. male    Chief Complaint: Hip Pain      Referred by: Itzel Feliz PA-C      HPI:    Initial Encounter (618/20):  Babatunde Parson is a 64 y.o. male who presents today with acute right sided hip pain. This pain started about one week ago after waking from a nap. He states that he sleeps on his side with his right leg crossed over the left.  The pain is located in the right anterior groin and lateral hip.  Patient denies any back pain.  Patient denies any associated numbness, tingling, weakness, bowel bladder dysfunction.  The pain was subtle at first and has worsened with time. He states that the pain is worst when initiating activity after prolonged rest. The pain typically improves with prolonged activity and stretching. He has taken Aleve three times with minimal relief. He has tried no other treatments to date.   This pain is described in detail below.    Physical Therapy: No    Non-pharmacologic Treatment:  Stretching activity help         · TENS?  No    Pain Medications:         · Currently taking:  Aleve p.r.n.    · Has tried in the past:      · Has not tried:  Opioids, Tylenol, Muscle relaxants, TCAs, SNRIs, anticonvulsants, topical creams    Blood thinners:  None    Interventional Therapies:  None    Relevant Surgeries:  None    Affecting sleep?  Yes    Affecting daily activities? yes    Depressive symptoms? no          · SI/HI? No    Work status: Employed    Pain Scores:    Best:       0/10  Worst:     10/10  Usually:   8/10  Today:    7/10    Review of Systems   Constitutional: Negative for activity change, appetite change, chills, fatigue, fever and unexpected weight change.   HENT: Negative for hearing loss.    Eyes: Negative for visual disturbance.   Respiratory: Negative for chest tightness and shortness of breath.    Cardiovascular: Negative for chest pain.   Gastrointestinal: Negative for abdominal pain, constipation, diarrhea, nausea  and vomiting.   Genitourinary: Negative for difficulty urinating.   Musculoskeletal: Positive for arthralgias, gait problem and myalgias. Negative for back pain and neck pain.   Skin: Negative for rash.   Neurological: Negative for dizziness, weakness, light-headedness, numbness and headaches.   Psychiatric/Behavioral: Positive for sleep disturbance. Negative for hallucinations and suicidal ideas. The patient is not nervous/anxious.        Past Medical History:   Diagnosis Date    Hyperlipidemia        Past Surgical History:   Procedure Laterality Date    COLONOSCOPY N/A 5/26/2020    Procedure: COLONOSCOPY;  Surgeon: JEN Cade MD;  Location: [address];  Service: Endoscopy;  Laterality: N/A;  COVID screening on 5/25/20 - [facility]  prep ins. emailed - [facility]       Social History     Socioeconomic History    Marital status:      Spouse name: Not on file    Number of children: Not on file    Years of education: Not on file    Highest education level: Not on file   Occupational History    Not on file   Social Needs    Financial resource strain: Not on file    Food insecurity     Worry: Not on file     Inability: Not on file    Transportation needs     Medical: Not on file     Non-medical: Not on file   Tobacco Use    Smoking status: Former Smoker    Smokeless tobacco: Never Used    Tobacco comment: quit 1990   Substance and Sexual Activity    Alcohol use: Not on file     Comment: 3 drinks daily  beer or liquor    Drug use: Never    Sexual activity: Not on file   Lifestyle    Physical activity     Days per week: Not on file     Minutes per session: Not on file    Stress: Not on file   Relationships    Social connections     Talks on phone: Not on file     Gets together: Not on file     Attends Jewish service: Not on file     Active member of club or organization: Not on file     Attends meetings of clubs or organizations: Not on file     Relationship status: Not on file   Other  Topics Concern    Not on file   Social History Narrative    Not on file       Review of patient's allergies indicates:  No Known Allergies    Current Outpatient Medications on File Prior to Visit   Medication Sig Dispense Refill    atorvastatin (LIPITOR) 20 MG tablet Take 1 tablet (20 mg total) by mouth once daily. 30 tablet 5    PARoxetine HCl 37.5 mg 24 hr tablet Take 37.5 mg by mouth every morning. 30 tablet 5    [DISCONTINUED] ALPRAZolam (XANAX) 0.5 MG tablet Take 1 tablet (0.5 mg total) by mouth 2 (two) times daily as needed for Anxiety. 20 tablet 0    [DISCONTINUED] docusate sodium (COLACE) 100 MG capsule Take 1 capsule (100 mg total) by mouth 2 (two) times daily. (Patient not taking: Reported on 6/18/2020) 60 capsule 0    [DISCONTINUED] hydrocortisone 2.5 % cream Apply topically 2 (two) times daily. (Patient not taking: Reported on 6/18/2020) 28 g 0    [DISCONTINUED] metoclopramide HCl (REGLAN) 10 MG tablet Take 1 tablet (10 mg total) by mouth every 6 (six) hours as needed (nausea). 30 tablet 0     No current facility-administered medications on file prior to visit.        Objective:      BP (!) 149/87   Pulse 68   Wt 87.3 kg (192 lb 7.4 oz)   BMI 26.10 kg/m²     Exam:  GEN:  Well developed, well nourished.  No acute distress.  Normal pain behavior.  HEENT:  No trauma.  Mucous membranes moist.  Nares patent bilaterally.  PSYCH: Normal affect. Thought content appropriate.  CHEST:  Breathing symmetric.  No audible wheezing.  ABD: Soft, non-distended.  SKIN:  Warm, pink, dry.  No rash on exposed areas.    EXT:  No cyanosis, clubbing, or edema.  No color change or changes in nail or hair growth.  NEURO/MUSCULOSKELETAL:  Fully alert, oriented, and appropriate. Speech normal nae. No cranial nerve deficits.   Gait:  Very antalgic.  No trendelenburg sign bilaterally.   Motor Strength:  5/5 motor strength throughout lower extremities.   Sensory:  No sensory deficit in the lower extremities.   Reflexes:   2 + and symmetric throughout.  Downgoing Babinski's bilaterally.  No clonus or spasticity.  L-Spine:  Slightly limited ROM without pain on flexion or extension.  Negative pain with axial/facet loading bilaterally.  Negative SLR bilaterally.   Negative Milgram's test  SI Joint/Hip:  Normal internal rotation of right hip without any pain.  External rotation of right hip somewhat painful.  Full active and passive hip flexion with minimal to no pain.  No pain with right hip flexion against resistance.  No TTP over lumbar paraspinals, bilateral SI joints, hips, piriformis muscles, or GTB.          Imaging:  No pertinent imaging at this time    Assessment:       Encounter Diagnosis   Name Primary?    Right hip pain Yes         Plan:       Babatunde was seen today for hip pain.    Diagnoses and all orders for this visit:    Right hip pain  -     X-Ray Hip 2 View Right; Future  -     X-Ray Lumbar Spine Ap Lateral w/Flex Ext; Future  -     meloxicam (MOBIC) 15 MG tablet; Take 1 tablet (15 mg total) by mouth once daily.        Babatunde Parson is a 64 y.o. male with acute right hip pain.  Exact etiology of pain is somewhat unclear though I do suspect soft tissue strain/sprain given improvement of pain with activity.  Normal internal rotation of right hip makes it less likely that this is related to right hip arthritis or intra-articular pathology.  Low suspicion for lumbar pathology given lack of back pain and normal lumbar examination..    1.  Right hip x-rays to rule out intra-articular or bony pathology.  2.  Lumbar x-rays to evaluate for instability in to rule out disc space narrowing.  3.  Start meloxicam 15 mg daily.  I advised patient take this medication every day with food for 2 weeks.  After this period of time he can take it as needed.  4.  Return to clinic in 2 weeks or sooner if needed.  If pain seems to be improving then we can continue conservative management with NSAIDs and stretching.  If pain persists we may  consider orthopedics referral or physical therapy referral.

## 2020-06-18 NOTE — LETTER
June 18, 2020      Itzel Feliz PA-C  [address] [facility] [address]           [facility]  [address], [facility] [address]  [facility] [address]  Phone: [phone]  Fax: [phone]          Patient: Babatunde Parson   MR Number: [id number]   YOB: 1955   Date of Visit: 6/18/2020       Dear Itzel Feliz:    Thank you for referring Babatunde Parson to me for evaluation. Attached you will find relevant portions of my assessment and plan of care.    If you have questions, please do not hesitate to call me. I look forward to following Babatunde Parson along with you.    Sincerely,    Ta Baird Jr., MD    Enclosure  CC:  No Recipients    If you would like to receive this communication electronically, please contact [email or web address] or [phone] to request more information on [employer] Link access.    For providers and/or their staff who would like to refer a patient to [facility], please contact us through our one-stop-shop provider referral line, [facility], at [phone].    If you feel you have received this communication in error or would no longer like to receive these types of communications, please e-mail [email or web address]

## 2020-06-19 ENCOUNTER — PATIENT MESSAGE (OUTPATIENT)
Dept: PAIN MEDICINE | Facility: CLINIC | Age: 65
End: 2020-06-19

## 2020-06-22 DIAGNOSIS — M25.551 RIGHT HIP PAIN: Primary | ICD-10-CM

## 2020-06-22 RX ORDER — TRAMADOL HYDROCHLORIDE 50 MG/1
50 TABLET ORAL EVERY 6 HOURS PRN
Qty: 60 TABLET | Refills: 0 | Status: SHIPPED | OUTPATIENT
Start: 2020-06-22 | End: 2020-07-07

## 2020-06-22 NOTE — PROGRESS NOTES
Patient still having severe right hip pain of unknown etiology.  Pain limits mobility and ADLs.  Now using crutches for ambulation.  Are prescribe tramadol 50 mg q.6 hours p.r.n..  A 15 day supply of 60 pills was provided today.  Prescription monitoring report reviewed today.  No inconsistencies noted.  I will also refer patient Orthopedic surgery for further evaluation.

## 2020-06-25 ENCOUNTER — TELEPHONE (OUTPATIENT)
Dept: PAIN MEDICINE | Facility: CLINIC | Age: 65
End: 2020-06-25

## 2020-06-29 ENCOUNTER — TELEPHONE (OUTPATIENT)
Dept: SURGERY | Facility: CLINIC | Age: 65
End: 2020-06-29

## 2020-08-31 ENCOUNTER — PATIENT MESSAGE (OUTPATIENT)
Dept: FAMILY MEDICINE | Facility: CLINIC | Age: 65
End: 2020-08-31

## 2020-09-01 NOTE — TELEPHONE ENCOUNTER
Spoke to pt regarding message below. I was able to get pt schduled with the Uro Doctor on 10/5 at 10. Let pt know of the appt. Pt stated he wanted to see us the same date at 9. I was able to get pt scheduled for an appt

## 2020-09-23 ENCOUNTER — OFFICE VISIT (OUTPATIENT)
Dept: SURGERY | Facility: CLINIC | Age: 65
End: 2020-09-23
Payer: MEDICARE

## 2020-09-23 VITALS
WEIGHT: 193.81 LBS | HEART RATE: 76 BPM | SYSTOLIC BLOOD PRESSURE: 124 MMHG | BODY MASS INDEX: 26.25 KG/M2 | HEIGHT: 72 IN | DIASTOLIC BLOOD PRESSURE: 82 MMHG

## 2020-09-23 DIAGNOSIS — L29.0 PRURITUS ANI: ICD-10-CM

## 2020-09-23 DIAGNOSIS — R15.0 INCOMPLETE DEFECATION: ICD-10-CM

## 2020-09-23 PROCEDURE — 99999 PR PBB SHADOW E&M-EST. PATIENT-LVL III: ICD-10-PCS | Mod: PBBFAC,,, | Performed by: COLON & RECTAL SURGERY

## 2020-09-23 PROCEDURE — 46600 PR DIAG2STIC A2SCOPY: ICD-10-PCS | Mod: S$PBB,,, | Performed by: COLON & RECTAL SURGERY

## 2020-09-23 PROCEDURE — 99999 PR PBB SHADOW E&M-EST. PATIENT-LVL III: CPT | Mod: PBBFAC,,, | Performed by: COLON & RECTAL SURGERY

## 2020-09-23 PROCEDURE — 99203 PR OFFICE/OUTPT VISIT, NEW, LEVL III, 30-44 MIN: ICD-10-PCS | Mod: 25,S$PBB,, | Performed by: COLON & RECTAL SURGERY

## 2020-09-23 PROCEDURE — 46600 DIAGNOSTIC ANOSCOPY SPX: CPT | Mod: 25,PBBFAC | Performed by: COLON & RECTAL SURGERY

## 2020-09-23 PROCEDURE — 99203 OFFICE O/P NEW LOW 30 MIN: CPT | Mod: 25,S$PBB,, | Performed by: COLON & RECTAL SURGERY

## 2020-09-23 PROCEDURE — 99213 OFFICE O/P EST LOW 20 MIN: CPT | Mod: PBBFAC,25 | Performed by: COLON & RECTAL SURGERY

## 2020-09-23 PROCEDURE — 46600 DIAGNOSTIC ANOSCOPY SPX: CPT | Mod: S$PBB,,, | Performed by: COLON & RECTAL SURGERY

## 2020-09-23 NOTE — PROGRESS NOTES
CRS Office Visit History and Physical    Referring Md:   No address on file    SUBJECTIVE:     Chief Complaint:  Hemorrhoids    History of Present Illness:  Patient is a 65 y.o. male presents with hemorrhoids.  He has had him for a long time.  His main complaint is itching and burning.  He states that he has 4-5 loose stools per day.  The burning occurs after a bowel movement.  He can see blood if he itches a lot.  He finds that he has to itch the area due to the burning sensation.  He will see blood on the toilet paper.  He denies any significant prolapse.  Uses hydrocortisone cream and preparation H for the hemorrhoidal symptoms.  It is no better.        Last Colonoscopy:  May 26, 2020  Findings:        The perianal and digital rectal examinations were normal.        Two semi-sessile polyps were found in the transverse colon and        ascending colon. The polyps were 4 to 5 mm in size. These polyps        were removed with a cold snare. Resection and retrieval were        complete. Verification of patient identification for the specimen        was done. Estimated blood loss was minimal.        Four pedunculated and semi-sessile and semi-pedunculated polyps were        found in the sigmoid colon, descending colon and transverse colon.        The polyps were 6 to 10 mm in size. These polyps were removed with a        hot snare. Resection and retrieval were complete. Verification of        patient identification for the specimen was done. Estimated blood        loss was minimal.        The terminal ileum appeared normal.        The retroflexed view of the distal rectum and anal verge was normal        and showed no anal or rectal abnormalities.     Past Medical History:   Diagnosis Date    Hyperlipidemia        Past Surgical History:   Procedure Laterality Date    COLONOSCOPY N/A 5/26/2020    Procedure: COLONOSCOPY;  Surgeon: JEN Cade MD;  Location: [facility] ([address] [place] [address]);  Service: Endoscopy;  Laterality:  N/A;  COVID screening on 5/25/20 - [facility]  prep ins. emailed - [facility]       Review of patient's allergies indicates:  No Known Allergies    Current Outpatient Medications on File Prior to Visit   Medication Sig Dispense Refill    atorvastatin (LIPITOR) 20 MG tablet Take 1 tablet (20 mg total) by mouth once daily. 30 tablet 5    PARoxetine HCl 37.5 mg 24 hr tablet Take 37.5 mg by mouth every morning. 30 tablet 5     No current facility-administered medications on file prior to visit.        No family history on file.    Social History     Tobacco Use    Smoking status: Former Smoker    Smokeless tobacco: Never Used    Tobacco comment: quit 1990   Substance Use Topics    Alcohol use: Not on file     Comment: 3 drinks daily  beer or liquor    Drug use: Never        Review of Systems:  ROS:  GENERAL: No fever, chills, fatigability or weight loss.  Integument:  No rashes, redness, icterus  CHEST: Denies AYALA, cyanosis, wheezing, cough and sputum production.  CARDIOVASCULAR: Denies chest pain, PND, orthopnea or reduced exercise tolerance.  GI:  Denies abd pain, dysphagia, nausea, vomiting, no hematemesis, no rectal pain  : Denies burning on urination, no hematuria, no bacteriuria  MSK:  No deformities, swelling, joint pain swelling  Neurologic:  No HAs, seizures, weakness, paresthesias, gait problems      OBJECTIVE:     Vital Signs (Most Recent)  /82 (BP Location: Left arm, Patient Position: Sitting, BP Method: Large (Automatic))   Pulse 76   Ht 6' (1.829 m)   Wt 87.9 kg (193 lb 12.8 oz)   BMI 26.28 kg/m²     Physical Exam:  General: White male in no distress   Skin/ Sclera anicteric  HEENT: anicteric, normocephalic, extraocular movements intact   Neck trachea midline  Chest symmetric, nl excursions, no retractions  Respiratory: respirations are even and unlabored     Anorectal:   Inspection         Pruritic changes  Fecal residue    HILARY:  nl tone, no masses, good squeeze, nl relaxation with  Valsalva  Extremities: Warm dry and intact.  NO CCE  Neuro: alert and oriented x 4.  Moves all extremities.         ASSESSMENT/PLAN:   Hemorrhoids  Incomplete defecation, fecal residue      Recommend  High fiber diet - 25 grams per day.  Emphasis on whole grain breads such as double fiber wheat bread and high fiber cereals and bars.    Drink 8 glasses of water per day 64 - 96 oz per day  Fiber supplements such as KONSYL, METAMUCIL can be taken 1-2 x per day  Regular exercise - 30 min brisk walking 5 days per week  OV 3 months        Anoscopy Procedure Note:   Verbal consent obtained    Indications:  hemorrhoids    Post procedure diagnosis:  Pruritus ani secondary to fecal residue    Procedure: anoscopy    Surgeon SUSIE    Assistant: Webb    Specimen none    Findings:  Fecal residue    Right posterior hemorrhoid grade 1  Right anterior hemorrhoid grade 1  Left lateral hemorrhoid grade 2    Pt tolerated procedure well.      No complications.

## 2020-09-30 ENCOUNTER — PATIENT MESSAGE (OUTPATIENT)
Dept: FAMILY MEDICINE | Facility: CLINIC | Age: 65
End: 2020-09-30

## 2020-10-05 ENCOUNTER — OFFICE VISIT (OUTPATIENT)
Dept: FAMILY MEDICINE | Facility: CLINIC | Age: 65
End: 2020-10-05
Payer: MEDICARE

## 2020-10-05 VITALS
WEIGHT: 197 LBS | SYSTOLIC BLOOD PRESSURE: 134 MMHG | TEMPERATURE: 98 F | HEIGHT: 72 IN | HEART RATE: 68 BPM | DIASTOLIC BLOOD PRESSURE: 72 MMHG | BODY MASS INDEX: 26.68 KG/M2

## 2020-10-05 DIAGNOSIS — N52.9 ERECTILE DYSFUNCTION, UNSPECIFIED ERECTILE DYSFUNCTION TYPE: ICD-10-CM

## 2020-10-05 DIAGNOSIS — Z23 NEED FOR VACCINATION WITH 13-POLYVALENT PNEUMOCOCCAL CONJUGATE VACCINE: Primary | ICD-10-CM

## 2020-10-05 DIAGNOSIS — K64.9 HEMORRHOIDS, UNSPECIFIED HEMORRHOID TYPE: ICD-10-CM

## 2020-10-05 DIAGNOSIS — D64.9 ANEMIA, UNSPECIFIED TYPE: ICD-10-CM

## 2020-10-05 DIAGNOSIS — F32.A DEPRESSION, UNSPECIFIED DEPRESSION TYPE: ICD-10-CM

## 2020-10-05 DIAGNOSIS — Z79.899 HIGH RISK MEDICATION USE: ICD-10-CM

## 2020-10-05 DIAGNOSIS — K21.9 GASTROESOPHAGEAL REFLUX DISEASE, UNSPECIFIED WHETHER ESOPHAGITIS PRESENT: ICD-10-CM

## 2020-10-05 DIAGNOSIS — E78.5 HYPERLIPIDEMIA, UNSPECIFIED HYPERLIPIDEMIA TYPE: ICD-10-CM

## 2020-10-05 PROBLEM — L29.0 PRURITUS ANI: Status: ACTIVE | Noted: 2020-10-05

## 2020-10-05 PROBLEM — R15.0 INCOMPLETE DEFECATION: Status: ACTIVE | Noted: 2020-10-05

## 2020-10-05 PROCEDURE — 90670 PNEUMOCOCCAL CONJUGATE VACCINE 13-VALENT LESS THAN 5YO & GREATER THAN: ICD-10-PCS | Mod: S$GLB,,, | Performed by: NURSE PRACTITIONER

## 2020-10-05 PROCEDURE — G0009 ADMIN PNEUMOCOCCAL VACCINE: HCPCS | Mod: S$GLB,,, | Performed by: NURSE PRACTITIONER

## 2020-10-05 PROCEDURE — 99214 OFFICE O/P EST MOD 30 MIN: CPT | Mod: 25,S$GLB,, | Performed by: NURSE PRACTITIONER

## 2020-10-05 PROCEDURE — G0009 PNEUMOCOCCAL CONJUGATE VACCINE 13-VALENT LESS THAN 5YO & GREATER THAN: ICD-10-PCS | Mod: S$GLB,,, | Performed by: NURSE PRACTITIONER

## 2020-10-05 PROCEDURE — 99214 PR OFFICE/OUTPT VISIT, EST, LEVL IV, 30-39 MIN: ICD-10-PCS | Mod: 25,S$GLB,, | Performed by: NURSE PRACTITIONER

## 2020-10-05 PROCEDURE — 90670 PCV13 VACCINE IM: CPT | Mod: S$GLB,,, | Performed by: NURSE PRACTITIONER

## 2020-10-05 RX ORDER — BUTALBITAL, ACETAMINOPHEN AND CAFFEINE 50; 325; 40 MG/1; MG/1; MG/1
1 TABLET ORAL EVERY 6 HOURS PRN
Qty: 30 TABLET | Refills: 0 | Status: SHIPPED | OUTPATIENT
Start: 2020-10-05 | End: 2020-11-04

## 2020-10-05 RX ORDER — PAROXETINE HYDROCHLORIDE HEMIHYDRATE 37.5 MG/1
37.5 TABLET, FILM COATED, EXTENDED RELEASE ORAL EVERY MORNING
Qty: 30 TABLET | Refills: 5 | Status: SHIPPED | OUTPATIENT
Start: 2020-10-05 | End: 2021-11-23 | Stop reason: SDUPTHER

## 2020-10-05 RX ORDER — TADALAFIL 10 MG/1
10 TABLET ORAL DAILY PRN
Qty: 30 TABLET | Refills: 1 | Status: SHIPPED | OUTPATIENT
Start: 2020-10-05 | End: 2022-02-08 | Stop reason: SDUPTHER

## 2020-10-05 RX ORDER — PANTOPRAZOLE SODIUM 40 MG/1
40 TABLET, DELAYED RELEASE ORAL DAILY
Qty: 30 TABLET | Refills: 11 | Status: SHIPPED | OUTPATIENT
Start: 2020-10-05 | End: 2022-02-08 | Stop reason: SDUPTHER

## 2020-10-05 NOTE — PROGRESS NOTES
"  SUBJECTIVE:    Patient ID: Babatunde Parson is a 65 y.o. male.    Chief Complaint: Follow-up (no bottles, went over meds verbally, FLU shot [id number], wants PNA 13// SW)    64-year-old male presents for check up.  Treated for GERD hyperlipidemia depression and migraines.  Overall feels as though depression is manageable.  Lost son last year so still has occasional days they feels as though he is still grieving.  Being followed by GI.  Last colonoscopy was in May.  No concerning findings.  Does have hemorrhoids.  Recently saw a GI for this problem.  No bleeding.  Appetite is normal.  No recent weight loss.  Due for labs.  Sleeps okay.  Not currently working.  Goes to bars on weekends primarily to drinking hang out with friends.          Admission on 05/26/2020, Discharged on 05/26/2020   Component Date Value Ref Range Status    Final Pathologic Diagnosis 05/26/2020    Final                    Value:1.  Colon, sigmoid, 1 cm polyp, biopsy:  - Tubular adenoma.  2.  Colon, ascending, polyp, biopsy:  - Tubular adenoma.  3.  Colon, transverse, polyps x2, biopsy:  - Tubular and tubulovillous adenoma, multiple fragments.  4.  Colon, left, polyps x2, biopsy:  - Tubular adenoma, single fragment.  - Sessile serrated polyp, single fragment.      Gross 05/26/2020    Final                    Value:Patient ID/ Pathology ID:  [id number]  Received in formalin in 4 parts.  Part 1:  Received in formalin, labeled "sigmoid colon polyp," is an 8 mm tan-pink  tissue fragment. The specimen is submitted entirely in cassette  [id number].  Part 2:  Received in formalin, labeled "ascending colon polyp," is a 10 x 4 x 1 mm  aggregate of tan-pink tissue fragments. The specimen is submitted entirely in  cassette [id number].  Part 3:  Received in formalin, labeled "transverse colon polyps x2," is a 23 x 5 x 5  mm aggregate of tan-pink tissue fragments. The specimen is submitted entirely  in cassette [id number].  Part " "4:  Received in formalin, labeled "left colon polyps x2," is a 14 x 2 x 5 mm  aggregate of tan-pink tissue fragments. The specimen is submitted entirely in  cassette [id number].  Alfredo: MB     Lab Visit on 05/25/2020   Component Date Value Ref Range Status    SARS-CoV2 (COVID-19) Qualitative P* 05/25/2020 Not Detected  Not Detected Final   Patient Message on 04/30/2020   Component Date Value Ref Range Status    WBC 05/14/2020 3.3* 3.8 - 10.8 Thousand/uL Final    RBC 05/14/2020 4.50  4.20 - 5.80 Million/uL Final    Hemoglobin 05/14/2020 14.5  13.2 - 17.1 g/dL Final    Hematocrit 05/14/2020 42.2  38.5 - 50.0 % Final    Mean Corpuscular Volume 05/14/2020 93.8  80.0 - 100.0 fL Final    Mean Corpuscular Hemoglobin 05/14/2020 32.2  27.0 - 33.0 pg Final    Mean Corpuscular Hemoglobin Conc 05/14/2020 34.4  32.0 - 36.0 g/dL Final    RDW 05/14/2020 11.7  11.0 - 15.0 % Final    Platelets 05/14/2020 194  140 - 400 Thousand/uL Final    MPV 05/14/2020 10.9  7.5 - 12.5 fL Final    Neutrophils Absolute 05/14/2020 1,587  1,500 - 7,800 cells/uL Final    Lymph # 05/14/2020 1,287  850 - 3,900 cells/uL Final    Mono # 05/14/2020 376  200 - 950 cells/uL Final    Eos # 05/14/2020 30  15 - 500 cells/uL Final    Baso # 05/14/2020 20  0 - 200 cells/uL Final    Neutrophils Relative 05/14/2020 48.1  % Final    Lymph% 05/14/2020 39.0  % Final    Mono% 05/14/2020 11.4  % Final    Eosinophil% 05/14/2020 0.9  % Final    Basophil% 05/14/2020 0.6  % Final    Glucose 05/14/2020 103* 65 - 99 mg/dL Final    BUN, Bld 05/14/2020 17  7 - 25 mg/dL Final    Creatinine 05/14/2020 0.86  0.70 - 1.25 mg/dL Final    eGFR if non African American 05/14/2020 91  > OR = 60 mL/min/1.73m2 Final    eGFR if African American 05/14/2020 105  > OR = 60 mL/min/1.73m2 Final    BUN/Creatinine Ratio 05/14/2020 NOT APPLICABLE  6 - 22 (calc) Final    Sodium 05/14/2020 142  135 - 146 mmol/L Final    Potassium 05/14/2020 4.1  3.5 - 5.3 mmol/L " Final    Chloride 05/14/2020 106  98 - 110 mmol/L Final    CO2 05/14/2020 29  20 - 32 mmol/L Final    Calcium 05/14/2020 9.2  8.6 - 10.3 mg/dL Final    Total Protein 05/14/2020 6.6  6.1 - 8.1 g/dL Final    Albumin 05/14/2020 4.4  3.6 - 5.1 g/dL Final    Globulin, Total 05/14/2020 2.2  1.9 - 3.7 g/dL (calc) Final    Albumin/Globulin Ratio 05/14/2020 2.0  1.0 - 2.5 (calc) Final    Total Bilirubin 05/14/2020 1.1  0.2 - 1.2 mg/dL Final    Alkaline Phosphatase 05/14/2020 72  35 - 144 U/L Final    AST 05/14/2020 21  10 - 35 U/L Final    ALT 05/14/2020 23  9 - 46 U/L Final    Cholesterol 05/14/2020 223* <200 mg/dL Final    HDL 05/14/2020 63  > OR = 40 mg/dL Final    Triglycerides 05/14/2020 156* <150 mg/dL Final    LDL Cholesterol 05/14/2020 131* mg/dL (calc) Final    Hdl/Cholesterol Ratio 05/14/2020 3.5  <5.0 (calc) Final    Non HDL Chol. (LDL+VLDL) 05/14/2020 160* <130 mg/dL (calc) Final    PROSTATE SPECIFIC ANTIGEN, SCR - Q* 05/14/2020 1.2  < OR = 4.0 ng/mL Final    TSH w/reflex to FT4 05/14/2020 0.51  0.40 - 4.50 mIU/L Final    Creatinine, Random Ur 05/14/2020 187  20 - 320 mg/dL Final    Microalb, Ur 05/14/2020 1.4  See Note: mg/dL Final    Microalb Creat Ratio 05/14/2020 7  <30 mcg/mg creat Final    Color, UA 05/14/2020 YELLOW  YELLOW Final    Appearance, UA 05/14/2020 CLEAR  CLEAR Final    Specific Mitchells, UA 05/14/2020 1.021  1.001 - 1.035 Final    pH, UA 05/14/2020 < OR = 5.0  5.0 - 8.0 Final    Glucose, UA 05/14/2020 NEGATIVE  NEGATIVE Final    Bilirubin, UA 05/14/2020 NEGATIVE  NEGATIVE Final    Ketones, UA 05/14/2020 NEGATIVE  NEGATIVE Final    Occult Blood UA 05/14/2020 NEGATIVE  NEGATIVE Final    Protein, UA 05/14/2020 NEGATIVE  NEGATIVE Final    Nitrite, UA 05/14/2020 NEGATIVE  NEGATIVE Final    Leukocytes, UA 05/14/2020 NEGATIVE  NEGATIVE Final    WBC Casts, UA 05/14/2020 0-5  < OR = 5 /HPF Final    RBC Casts, UA 05/14/2020 NONE SEEN  < OR = 2 /HPF Final    Squam  Epithel, UA 05/14/2020 NONE SEEN  < OR = 5 /HPF Final    Bacteria, UA 05/14/2020 NONE SEEN  NONE SEEN /HPF Final    Hyaline Casts, UA 05/14/2020 NONE SEEN  NONE SEEN /LPF Final    Reflexive Urine Culture 05/14/2020 NO CULTURE INDICATED   Final       Past Medical History:   Diagnosis Date    Hyperlipidemia      Social History     Socioeconomic History    Marital status:      Spouse name: Not on file    Number of children: Not on file    Years of education: Not on file    Highest education level: Not on file   Occupational History    Not on file   Social Needs    Financial resource strain: Not on file    Food insecurity     Worry: Not on file     Inability: Not on file    Transportation needs     Medical: Not on file     Non-medical: Not on file   Tobacco Use    Smoking status: Former Smoker    Smokeless tobacco: Never Used    Tobacco comment: quit 1990   Substance and Sexual Activity    Alcohol use: Not on file     Comment: 3 drinks daily  beer or liquor    Drug use: Never    Sexual activity: Not on file   Lifestyle    Physical activity     Days per week: Not on file     Minutes per session: Not on file    Stress: Not on file   Relationships    Social connections     Talks on phone: Not on file     Gets together: Not on file     Attends Taoist service: Not on file     Active member of club or organization: Not on file     Attends meetings of clubs or organizations: Not on file     Relationship status: Not on file   Other Topics Concern    Not on file   Social History Narrative    Not on file     Past Surgical History:   Procedure Laterality Date    COLONOSCOPY N/A 5/26/2020    Procedure: COLONOSCOPY;  Surgeon: JEN Cade MD;  Location: [address];  Service: Endoscopy;  Laterality: N/A;  COVID screening on 5/25/20 - [facility]  prep ins. emailed - [facility]     History reviewed. No pertinent family history.    Review of patient's allergies indicates:  No Known Allergies    Current  Outpatient Medications:     atorvastatin (LIPITOR) 20 MG tablet, Take 1 tablet (20 mg total) by mouth once daily., Disp: 30 tablet, Rfl: 5    PARoxetine HCl 37.5 mg 24 hr tablet, Take 37.5 mg by mouth every morning., Disp: 30 tablet, Rfl: 5    butalbital-acetaminophen-caffeine -40 mg (FIORICET, ESGIC) -40 mg per tablet, Take 1 tablet by mouth every 6 (six) hours as needed for Pain., Disp: 30 tablet, Rfl: 0    pantoprazole (PROTONIX) 40 MG tablet, Take 1 tablet (40 mg total) by mouth once daily., Disp: 30 tablet, Rfl: 11    tadalafiL (CIALIS) 10 MG tablet, Take 1 tablet (10 mg total) by mouth daily as needed for Erectile Dysfunction., Disp: 30 tablet, Rfl: 1    Review of Systems   Constitutional: Negative for fatigue, fever and unexpected weight change.   HENT: Negative for ear pain, sinus pressure and sore throat.    Eyes: Negative for pain.   Respiratory: Negative for cough and shortness of breath.    Cardiovascular: Negative for chest pain and leg swelling.   Gastrointestinal: Negative for abdominal pain, constipation, nausea and vomiting.   Genitourinary: Negative for dysuria, frequency and urgency.   Musculoskeletal: Negative for arthralgias.   Skin: Negative for rash.   Neurological: Negative for dizziness, weakness and headaches.   Psychiatric/Behavioral: Negative for sleep disturbance.          Objective:      Vitals:    10/05/20 0911   BP: 134/72   Pulse: 68   Temp: 97.7 °F (36.5 °C)   Weight: 89.4 kg (197 lb)   Height: 6' (1.829 m)     Physical Exam  Constitutional:       Appearance: He is well-developed.   HENT:      Head: Normocephalic and atraumatic.      Right Ear: External ear normal.      Left Ear: External ear normal.   Eyes:      Pupils: Pupils are equal, round, and reactive to light.   Neck:      Musculoskeletal: Neck supple.      Trachea: No tracheal deviation.   Cardiovascular:      Rate and Rhythm: Normal rate and regular rhythm.      Heart sounds: No murmur. No friction rub. No  gallop.    Pulmonary:      Breath sounds: Normal breath sounds. No stridor. No wheezing or rales.   Abdominal:      Palpations: Abdomen is soft. There is no mass.      Tenderness: There is no abdominal tenderness.   Musculoskeletal:         General: No tenderness or deformity.   Lymphadenopathy:      Cervical: No cervical adenopathy.   Skin:     General: Skin is warm and dry.   Neurological:      Mental Status: He is alert and oriented to person, place, and time.      Coordination: Coordination normal.   Psychiatric:         Thought Content: Thought content normal.           Assessment:       1. Need for vaccination with 13-polyvalent pneumococcal conjugate vaccine    2. Depression, unspecified depression type    3. Erectile dysfunction, unspecified erectile dysfunction type    4. Hyperlipidemia, unspecified hyperlipidemia type    5. Anemia, unspecified type    6. High risk medication use    7. Gastroesophageal reflux disease, unspecified whether esophagitis present    8. Hemorrhoids, unspecified hemorrhoid type         Plan:       Need for vaccination with 13-polyvalent pneumococcal conjugate vaccine  -     Pneumococcal Conjugate Vaccine (13 Valent) (IM)    Depression, unspecified depression type  -     PARoxetine HCl 37.5 mg 24 hr tablet; Take 37.5 mg by mouth every morning.  Dispense: 30 tablet; Refill: 5    Erectile dysfunction, unspecified erectile dysfunction type  -     tadalafiL (CIALIS) 10 MG tablet; Take 1 tablet (10 mg total) by mouth daily as needed for Erectile Dysfunction.  Dispense: 30 tablet; Refill: 1    Hyperlipidemia, unspecified hyperlipidemia type  -     Lipid Panel; Future; Expected date: 10/05/2020    Anemia, unspecified type  -     CBC auto differential; Future; Expected date: 10/05/2020  -     Comprehensive Metabolic Panel; Future; Expected date: 10/05/2020    High risk medication use  -     CBC auto differential; Future; Expected date: 10/05/2020  -     Comprehensive Metabolic Panel;  Future; Expected date: 10/05/2020    Gastroesophageal reflux disease, unspecified whether esophagitis present  -     pantoprazole (PROTONIX) 40 MG tablet; Take 1 tablet (40 mg total) by mouth once daily.  Dispense: 30 tablet; Refill: 11    Hemorrhoids, unspecified hemorrhoid type    Other orders  -     butalbital-acetaminophen-caffeine -40 mg (FIORICET, ESGIC) -40 mg per tablet; Take 1 tablet by mouth every 6 (six) hours as needed for Pain.  Dispense: 30 tablet; Refill: 0      Follow up in about 3 months (around 1/5/2021) for medication management.        10/12/2020 Lisa Randle

## 2020-11-04 ENCOUNTER — TELEPHONE (OUTPATIENT)
Dept: FAMILY MEDICINE | Facility: CLINIC | Age: 65
End: 2020-11-04

## 2020-11-25 LAB
ALBUMIN SERPL-MCNC: 4.5 G/DL (ref 3.6–5.1)
ALBUMIN/GLOB SERPL: 1.9 (CALC) (ref 1–2.5)
ALP SERPL-CCNC: 91 U/L (ref 35–144)
ALT SERPL-CCNC: 47 U/L (ref 9–46)
AST SERPL-CCNC: 34 U/L (ref 10–35)
BASOPHILS # BLD AUTO: 20 CELLS/UL (ref 0–200)
BASOPHILS NFR BLD AUTO: 0.5 %
BILIRUB SERPL-MCNC: 1 MG/DL (ref 0.2–1.2)
BUN SERPL-MCNC: 17 MG/DL (ref 7–25)
BUN/CREAT SERPL: ABNORMAL (CALC) (ref 6–22)
CALCIUM SERPL-MCNC: 9.4 MG/DL (ref 8.6–10.3)
CHLORIDE SERPL-SCNC: 106 MMOL/L (ref 98–110)
CHOLEST SERPL-MCNC: 229 MG/DL
CHOLEST/HDLC SERPL: 3.6 (CALC)
CO2 SERPL-SCNC: 31 MMOL/L (ref 20–32)
CREAT SERPL-MCNC: 0.91 MG/DL (ref 0.7–1.25)
EOSINOPHIL # BLD AUTO: 40 CELLS/UL (ref 15–500)
EOSINOPHIL NFR BLD AUTO: 1 %
ERYTHROCYTE [DISTWIDTH] IN BLOOD BY AUTOMATED COUNT: 11.8 % (ref 11–15)
GFRSERPLBLD MDRD-ARVRAT: 88 ML/MIN/1.73M2
GLOBULIN SER CALC-MCNC: 2.4 G/DL (CALC) (ref 1.9–3.7)
GLUCOSE SERPL-MCNC: 112 MG/DL (ref 65–99)
HCT VFR BLD AUTO: 43 % (ref 38.5–50)
HDLC SERPL-MCNC: 64 MG/DL
HGB BLD-MCNC: 14.4 G/DL (ref 13.2–17.1)
LDLC SERPL CALC-MCNC: 125 MG/DL (CALC)
LYMPHOCYTES # BLD AUTO: 1224 CELLS/UL (ref 850–3900)
LYMPHOCYTES NFR BLD AUTO: 30.6 %
MCH RBC QN AUTO: 31.4 PG (ref 27–33)
MCHC RBC AUTO-ENTMCNC: 33.5 G/DL (ref 32–36)
MCV RBC AUTO: 93.7 FL (ref 80–100)
MONOCYTES # BLD AUTO: 400 CELLS/UL (ref 200–950)
MONOCYTES NFR BLD AUTO: 10 %
NEUTROPHILS # BLD AUTO: 2316 CELLS/UL (ref 1500–7800)
NEUTROPHILS NFR BLD AUTO: 57.9 %
NONHDLC SERPL-MCNC: 165 MG/DL (CALC)
PLATELET # BLD AUTO: 180 THOUSAND/UL (ref 140–400)
PMV BLD REES-ECKER: 11 FL (ref 7.5–12.5)
POTASSIUM SERPL-SCNC: 4.2 MMOL/L (ref 3.5–5.3)
PROT SERPL-MCNC: 6.9 G/DL (ref 6.1–8.1)
RBC # BLD AUTO: 4.59 MILLION/UL (ref 4.2–5.8)
SODIUM SERPL-SCNC: 141 MMOL/L (ref 135–146)
TRIGL SERPL-MCNC: 246 MG/DL
WBC # BLD AUTO: 4 THOUSAND/UL (ref 3.8–10.8)

## 2021-01-06 ENCOUNTER — PATIENT MESSAGE (OUTPATIENT)
Dept: SURGERY | Facility: CLINIC | Age: 66
End: 2021-01-06

## 2021-01-26 ENCOUNTER — PATIENT MESSAGE (OUTPATIENT)
Dept: SURGERY | Facility: CLINIC | Age: 66
End: 2021-01-26

## 2021-02-09 ENCOUNTER — PATIENT MESSAGE (OUTPATIENT)
Dept: FAMILY MEDICINE | Facility: CLINIC | Age: 66
End: 2021-02-09

## 2021-02-10 ENCOUNTER — PATIENT MESSAGE (OUTPATIENT)
Dept: FAMILY MEDICINE | Facility: CLINIC | Age: 66
End: 2021-02-10

## 2021-02-25 ENCOUNTER — IMMUNIZATION (OUTPATIENT)
Dept: PHARMACY | Facility: CLINIC | Age: 66
End: 2021-02-25
Payer: MEDICARE

## 2021-02-25 DIAGNOSIS — Z23 NEED FOR VACCINATION: Primary | ICD-10-CM

## 2021-03-25 ENCOUNTER — IMMUNIZATION (OUTPATIENT)
Dept: PHARMACY | Facility: CLINIC | Age: 66
End: 2021-03-25
Payer: MEDICARE

## 2021-03-25 DIAGNOSIS — Z23 NEED FOR VACCINATION: Primary | ICD-10-CM

## 2021-04-05 ENCOUNTER — PATIENT MESSAGE (OUTPATIENT)
Dept: FAMILY MEDICINE | Facility: CLINIC | Age: 66
End: 2021-04-05

## 2021-04-05 DIAGNOSIS — Z79.899 ENCOUNTER FOR LONG-TERM (CURRENT) USE OF OTHER MEDICATIONS: Primary | ICD-10-CM

## 2021-04-06 ENCOUNTER — PATIENT MESSAGE (OUTPATIENT)
Dept: FAMILY MEDICINE | Facility: CLINIC | Age: 66
End: 2021-04-06

## 2021-04-06 DIAGNOSIS — E78.5 HYPERLIPIDEMIA, UNSPECIFIED HYPERLIPIDEMIA TYPE: ICD-10-CM

## 2021-04-06 RX ORDER — ATORVASTATIN CALCIUM 20 MG/1
20 TABLET, FILM COATED ORAL DAILY
Qty: 30 TABLET | Refills: 5 | Status: SHIPPED | OUTPATIENT
Start: 2021-04-06 | End: 2022-02-08 | Stop reason: SDUPTHER

## 2021-04-15 ENCOUNTER — TELEPHONE (OUTPATIENT)
Dept: FAMILY MEDICINE | Facility: CLINIC | Age: 66
End: 2021-04-15

## 2021-04-15 LAB
ALBUMIN SERPL-MCNC: 4.4 G/DL (ref 3.6–5.1)
ALBUMIN/GLOB SERPL: 2 (CALC) (ref 1–2.5)
ALP SERPL-CCNC: 71 U/L (ref 35–144)
ALT SERPL-CCNC: 22 U/L (ref 9–46)
APPEARANCE UR: CLEAR
AST SERPL-CCNC: 21 U/L (ref 10–35)
BACTERIA #/AREA URNS HPF: ABNORMAL /HPF
BACTERIA UR CULT: NORMAL
BASOPHILS # BLD AUTO: 21 CELLS/UL (ref 0–200)
BASOPHILS NFR BLD AUTO: 0.6 %
BILIRUB SERPL-MCNC: 1.1 MG/DL (ref 0.2–1.2)
BILIRUB UR QL STRIP: NEGATIVE
BUN SERPL-MCNC: 16 MG/DL (ref 7–25)
BUN/CREAT SERPL: NORMAL (CALC) (ref 6–22)
CALCIUM SERPL-MCNC: 9.5 MG/DL (ref 8.6–10.3)
CHLORIDE SERPL-SCNC: 104 MMOL/L (ref 98–110)
CHOLEST SERPL-MCNC: 216 MG/DL
CHOLEST/HDLC SERPL: 3.4 (CALC)
CO2 SERPL-SCNC: 27 MMOL/L (ref 20–32)
COLOR UR: YELLOW
CREAT SERPL-MCNC: 0.83 MG/DL (ref 0.7–1.25)
EOSINOPHIL # BLD AUTO: 60 CELLS/UL (ref 15–500)
EOSINOPHIL NFR BLD AUTO: 1.7 %
ERYTHROCYTE [DISTWIDTH] IN BLOOD BY AUTOMATED COUNT: 12.3 % (ref 11–15)
GLOBULIN SER CALC-MCNC: 2.2 G/DL (CALC) (ref 1.9–3.7)
GLUCOSE SERPL-MCNC: 87 MG/DL (ref 65–99)
GLUCOSE UR QL STRIP: NEGATIVE
HCT VFR BLD AUTO: 42.9 % (ref 38.5–50)
HDLC SERPL-MCNC: 64 MG/DL
HGB BLD-MCNC: 14.6 G/DL (ref 13.2–17.1)
HGB UR QL STRIP: NEGATIVE
HYALINE CASTS #/AREA URNS LPF: ABNORMAL /LPF
KETONES UR QL STRIP: NEGATIVE
LDLC SERPL CALC-MCNC: 122 MG/DL (CALC)
LEUKOCYTE ESTERASE UR QL STRIP: ABNORMAL
LYMPHOCYTES # BLD AUTO: 1369 CELLS/UL (ref 850–3900)
LYMPHOCYTES NFR BLD AUTO: 39.1 %
MCH RBC QN AUTO: 31.9 PG (ref 27–33)
MCHC RBC AUTO-ENTMCNC: 34 G/DL (ref 32–36)
MCV RBC AUTO: 93.9 FL (ref 80–100)
MONOCYTES # BLD AUTO: 361 CELLS/UL (ref 200–950)
MONOCYTES NFR BLD AUTO: 10.3 %
NEUTROPHILS # BLD AUTO: 1691 CELLS/UL (ref 1500–7800)
NEUTROPHILS NFR BLD AUTO: 48.3 %
NITRITE UR QL STRIP: NEGATIVE
NONHDLC SERPL-MCNC: 152 MG/DL (CALC)
PH UR STRIP: 5.5 [PH] (ref 5–8)
PLATELET # BLD AUTO: 186 THOUSAND/UL (ref 140–400)
PMV BLD REES-ECKER: 10.8 FL (ref 7.5–12.5)
POTASSIUM SERPL-SCNC: 4.1 MMOL/L (ref 3.5–5.3)
PROT SERPL-MCNC: 6.6 G/DL (ref 6.1–8.1)
PROT UR QL STRIP: NEGATIVE
RBC # BLD AUTO: 4.57 MILLION/UL (ref 4.2–5.8)
RBC #/AREA URNS HPF: ABNORMAL /HPF
SODIUM SERPL-SCNC: 141 MMOL/L (ref 135–146)
SP GR UR STRIP: 1.02 (ref 1–1.03)
SQUAMOUS #/AREA URNS HPF: ABNORMAL /HPF
TRIGL SERPL-MCNC: 183 MG/DL
TSH SERPL-ACNC: 1.09 MIU/L (ref 0.4–4.5)
WBC # BLD AUTO: 3.5 THOUSAND/UL (ref 3.8–10.8)
WBC #/AREA URNS HPF: ABNORMAL /HPF

## 2021-05-03 ENCOUNTER — TELEPHONE (OUTPATIENT)
Dept: DERMATOLOGY | Facility: CLINIC | Age: 66
End: 2021-05-03

## 2021-05-24 ENCOUNTER — TELEPHONE (OUTPATIENT)
Dept: ORTHOPEDICS | Facility: CLINIC | Age: 66
End: 2021-05-24

## 2021-06-29 ENCOUNTER — TELEPHONE (OUTPATIENT)
Dept: DERMATOLOGY | Facility: CLINIC | Age: 66
End: 2021-06-29

## 2021-06-29 ENCOUNTER — PATIENT MESSAGE (OUTPATIENT)
Dept: DERMATOLOGY | Facility: CLINIC | Age: 66
End: 2021-06-29

## 2021-08-23 ENCOUNTER — TELEPHONE (OUTPATIENT)
Dept: DERMATOLOGY | Facility: CLINIC | Age: 66
End: 2021-08-23

## 2021-08-24 ENCOUNTER — TELEPHONE (OUTPATIENT)
Dept: DERMATOLOGY | Facility: CLINIC | Age: 66
End: 2021-08-24

## 2021-09-28 ENCOUNTER — TELEPHONE (OUTPATIENT)
Dept: DERMATOLOGY | Facility: CLINIC | Age: 66
End: 2021-09-28

## 2021-10-13 ENCOUNTER — PROCEDURE VISIT (OUTPATIENT)
Dept: DERMATOLOGY | Facility: CLINIC | Age: 66
End: 2021-10-13
Payer: MEDICARE

## 2021-10-13 VITALS — SYSTOLIC BLOOD PRESSURE: 174 MMHG | HEART RATE: 49 BPM | DIASTOLIC BLOOD PRESSURE: 79 MMHG

## 2021-10-13 DIAGNOSIS — C44.41 BASAL CELL CARCINOMA, SCALP/NECK: Primary | ICD-10-CM

## 2021-10-13 DIAGNOSIS — C44.519 BASAL CELL CARCINOMA, TRUNK: ICD-10-CM

## 2021-10-13 PROCEDURE — 13101 PR RECMPL WND TRUNK 2.6-7.5 CM: ICD-10-PCS | Mod: 59,S$PBB,, | Performed by: DERMATOLOGY

## 2021-10-13 PROCEDURE — 13131 CMPLX RPR F/C/C/M/N/AX/G/H/F: CPT | Mod: S$PBB,,, | Performed by: DERMATOLOGY

## 2021-10-13 PROCEDURE — 17313 MOHS 1 STAGE T/A/L: CPT | Mod: 59,PBBFAC | Performed by: DERMATOLOGY

## 2021-10-13 PROCEDURE — 99499 UNLISTED E&M SERVICE: CPT | Mod: S$PBB,,, | Performed by: DERMATOLOGY

## 2021-10-13 PROCEDURE — 17313 MOHS 1 STAGE T/A/L: CPT | Mod: 59,S$PBB,, | Performed by: DERMATOLOGY

## 2021-10-13 PROCEDURE — 13101 CMPLX RPR TRUNK 2.6-7.5 CM: CPT | Mod: 59,S$PBB,, | Performed by: DERMATOLOGY

## 2021-10-13 PROCEDURE — 17314 MOHS ADDL STAGE T/A/L: CPT | Mod: PBBFAC | Performed by: DERMATOLOGY

## 2021-10-13 PROCEDURE — 13131 PR RECMPL WND HEAD,FAC,HAND 1.1-2.5 CM: ICD-10-PCS | Mod: S$PBB,,, | Performed by: DERMATOLOGY

## 2021-10-13 PROCEDURE — 17311 MOHS 1 STAGE H/N/HF/G: CPT | Mod: S$PBB,,, | Performed by: DERMATOLOGY

## 2021-10-13 PROCEDURE — 17311: ICD-10-PCS | Mod: S$PBB,,, | Performed by: DERMATOLOGY

## 2021-10-13 PROCEDURE — 17314 MOHS ADDL STAGE T/A/L: CPT | Mod: S$PBB,,, | Performed by: DERMATOLOGY

## 2021-10-13 PROCEDURE — 17313: ICD-10-PCS | Mod: 59,S$PBB,, | Performed by: DERMATOLOGY

## 2021-10-13 PROCEDURE — 13101 CMPLX RPR TRUNK 2.6-7.5 CM: CPT | Mod: 59,PBBFAC | Performed by: DERMATOLOGY

## 2021-10-13 PROCEDURE — 17314: ICD-10-PCS | Mod: S$PBB,,, | Performed by: DERMATOLOGY

## 2021-10-13 PROCEDURE — 99499 NO LOS: ICD-10-PCS | Mod: S$PBB,,, | Performed by: DERMATOLOGY

## 2021-10-13 PROCEDURE — 13131 CMPLX RPR F/C/C/M/N/AX/G/H/F: CPT | Mod: PBBFAC | Performed by: DERMATOLOGY

## 2021-10-13 PROCEDURE — 17311 MOHS 1 STAGE H/N/HF/G: CPT | Mod: PBBFAC | Performed by: DERMATOLOGY

## 2021-10-15 ENCOUNTER — HOSPITAL ENCOUNTER (OUTPATIENT)
Dept: RADIOLOGY | Facility: HOSPITAL | Age: 66
Discharge: HOME | End: 2021-10-15
Attending: SPECIALIST
Payer: MEDICARE

## 2021-10-15 DIAGNOSIS — N20.0 CALCULUS OF KIDNEY: Primary | ICD-10-CM

## 2021-10-15 DIAGNOSIS — N20.0 CALCULUS OF KIDNEY: ICD-10-CM

## 2021-10-15 PROCEDURE — 74018 RADEX ABDOMEN 1 VIEW: CPT | Mod: TC,PO

## 2021-10-25 ENCOUNTER — PATIENT MESSAGE (OUTPATIENT)
Dept: FAMILY MEDICINE | Facility: CLINIC | Age: 66
End: 2021-10-25
Payer: MEDICARE

## 2021-10-27 ENCOUNTER — OFFICE VISIT (OUTPATIENT)
Dept: DERMATOLOGY | Facility: CLINIC | Age: 66
End: 2021-10-27
Payer: MEDICARE

## 2021-10-27 DIAGNOSIS — Z09 POSTOP CHECK: Primary | ICD-10-CM

## 2021-10-27 PROCEDURE — 99211 OFF/OP EST MAY X REQ PHY/QHP: CPT | Mod: PBBFAC | Performed by: DERMATOLOGY

## 2021-10-27 PROCEDURE — 99024 POSTOP FOLLOW-UP VISIT: CPT | Mod: POP,,, | Performed by: DERMATOLOGY

## 2021-10-27 PROCEDURE — 99999 PR PBB SHADOW E&M-EST. PATIENT-LVL I: ICD-10-PCS | Mod: PBBFAC,,, | Performed by: DERMATOLOGY

## 2021-10-27 PROCEDURE — 99024 PR POST-OP FOLLOW-UP VISIT: ICD-10-PCS | Mod: POP,,, | Performed by: DERMATOLOGY

## 2021-10-27 PROCEDURE — 99999 PR PBB SHADOW E&M-EST. PATIENT-LVL I: CPT | Mod: PBBFAC,,, | Performed by: DERMATOLOGY

## 2021-11-23 DIAGNOSIS — F32.A DEPRESSION, UNSPECIFIED DEPRESSION TYPE: ICD-10-CM

## 2021-11-23 RX ORDER — PAROXETINE HYDROCHLORIDE HEMIHYDRATE 37.5 MG/1
37.5 TABLET, FILM COATED, EXTENDED RELEASE ORAL EVERY MORNING
Qty: 30 TABLET | Refills: 0 | Status: SHIPPED | OUTPATIENT
Start: 2021-11-23 | End: 2022-02-01 | Stop reason: SDUPTHER

## 2021-11-23 RX ORDER — ALPRAZOLAM 0.5 MG/1
0.5 TABLET ORAL DAILY PRN
Qty: 20 TABLET | Refills: 0 | Status: SHIPPED | OUTPATIENT
Start: 2021-11-23 | End: 2022-02-08 | Stop reason: SDUPTHER

## 2021-12-02 ENCOUNTER — IMMUNIZATION (OUTPATIENT)
Dept: PRIMARY CARE CLINIC | Facility: CLINIC | Age: 66
End: 2021-12-02
Payer: MEDICARE

## 2021-12-02 DIAGNOSIS — Z23 NEED FOR VACCINATION: Primary | ICD-10-CM

## 2021-12-02 PROCEDURE — 0064A COVID-19, MRNA, LNP-S, PF, 100 MCG/0.25 ML DOSE VACCINE (MODERNA BOOSTER): CPT | Mod: PBBFAC,PN

## 2021-12-16 ENCOUNTER — TELEPHONE (OUTPATIENT)
Dept: FAMILY MEDICINE | Facility: CLINIC | Age: 66
End: 2021-12-16

## 2021-12-16 ENCOUNTER — PATIENT MESSAGE (OUTPATIENT)
Dept: DERMATOLOGY | Facility: CLINIC | Age: 66
End: 2021-12-16
Payer: MEDICARE

## 2022-01-31 ENCOUNTER — PATIENT MESSAGE (OUTPATIENT)
Dept: FAMILY MEDICINE | Facility: CLINIC | Age: 67
End: 2022-01-31
Payer: MEDICARE

## 2022-01-31 DIAGNOSIS — F32.A DEPRESSION, UNSPECIFIED DEPRESSION TYPE: ICD-10-CM

## 2022-01-31 DIAGNOSIS — Z12.5 SCREENING FOR PROSTATE CANCER: ICD-10-CM

## 2022-01-31 DIAGNOSIS — Z79.899 ENCOUNTER FOR LONG-TERM (CURRENT) USE OF OTHER MEDICATIONS: Primary | ICD-10-CM

## 2022-01-31 DIAGNOSIS — E78.5 HYPERLIPIDEMIA, UNSPECIFIED HYPERLIPIDEMIA TYPE: ICD-10-CM

## 2022-02-01 RX ORDER — PAROXETINE HYDROCHLORIDE HEMIHYDRATE 37.5 MG/1
37.5 TABLET, FILM COATED, EXTENDED RELEASE ORAL EVERY MORNING
Qty: 30 TABLET | Refills: 0 | Status: SHIPPED | OUTPATIENT
Start: 2022-02-01 | End: 2022-02-08 | Stop reason: SDUPTHER

## 2022-02-08 ENCOUNTER — OFFICE VISIT (OUTPATIENT)
Dept: FAMILY MEDICINE | Facility: CLINIC | Age: 67
End: 2022-02-08
Payer: MEDICARE

## 2022-02-08 VITALS
HEART RATE: 64 BPM | SYSTOLIC BLOOD PRESSURE: 134 MMHG | BODY MASS INDEX: 24.77 KG/M2 | HEIGHT: 70 IN | DIASTOLIC BLOOD PRESSURE: 80 MMHG | WEIGHT: 173 LBS

## 2022-02-08 DIAGNOSIS — E78.5 HYPERLIPIDEMIA, UNSPECIFIED HYPERLIPIDEMIA TYPE: ICD-10-CM

## 2022-02-08 DIAGNOSIS — F32.A DEPRESSION, UNSPECIFIED DEPRESSION TYPE: ICD-10-CM

## 2022-02-08 DIAGNOSIS — F10.10 ALCOHOL ABUSE: ICD-10-CM

## 2022-02-08 DIAGNOSIS — Z79.899 HIGH RISK MEDICATION USE: Primary | ICD-10-CM

## 2022-02-08 DIAGNOSIS — K21.9 GASTROESOPHAGEAL REFLUX DISEASE, UNSPECIFIED WHETHER ESOPHAGITIS PRESENT: ICD-10-CM

## 2022-02-08 DIAGNOSIS — N52.9 ERECTILE DYSFUNCTION, UNSPECIFIED ERECTILE DYSFUNCTION TYPE: ICD-10-CM

## 2022-02-08 LAB
ALBUMIN SERPL-MCNC: 4.4 G/DL (ref 3.6–5.1)
ALBUMIN/GLOB SERPL: 2 (CALC) (ref 1–2.5)
ALP SERPL-CCNC: 70 U/L (ref 35–144)
ALT SERPL-CCNC: 20 U/L (ref 9–46)
AST SERPL-CCNC: 19 U/L (ref 10–35)
BASOPHILS # BLD AUTO: 19 CELLS/UL (ref 0–200)
BASOPHILS NFR BLD AUTO: 0.5 %
BILIRUB SERPL-MCNC: 1.1 MG/DL (ref 0.2–1.2)
BUN SERPL-MCNC: 17 MG/DL (ref 7–25)
BUN/CREAT SERPL: ABNORMAL (CALC) (ref 6–22)
CALCIUM SERPL-MCNC: 9.3 MG/DL (ref 8.6–10.3)
CHLORIDE SERPL-SCNC: 105 MMOL/L (ref 98–110)
CHOLEST SERPL-MCNC: 203 MG/DL
CHOLEST/HDLC SERPL: 2.9 (CALC)
CO2 SERPL-SCNC: 28 MMOL/L (ref 20–32)
CREAT SERPL-MCNC: 0.9 MG/DL (ref 0.7–1.25)
EOSINOPHIL # BLD AUTO: 52 CELLS/UL (ref 15–500)
EOSINOPHIL NFR BLD AUTO: 1.4 %
ERYTHROCYTE [DISTWIDTH] IN BLOOD BY AUTOMATED COUNT: 12.2 % (ref 11–15)
GLOBULIN SER CALC-MCNC: 2.2 G/DL (CALC) (ref 1.9–3.7)
GLUCOSE SERPL-MCNC: 107 MG/DL (ref 65–99)
HCT VFR BLD AUTO: 43.2 % (ref 38.5–50)
HDLC SERPL-MCNC: 69 MG/DL
HGB BLD-MCNC: 14.7 G/DL (ref 13.2–17.1)
LDLC SERPL CALC-MCNC: 111 MG/DL (CALC)
LYMPHOCYTES # BLD AUTO: 1295 CELLS/UL (ref 850–3900)
LYMPHOCYTES NFR BLD AUTO: 35 %
MCH RBC QN AUTO: 31 PG (ref 27–33)
MCHC RBC AUTO-ENTMCNC: 34 G/DL (ref 32–36)
MCV RBC AUTO: 91.1 FL (ref 80–100)
MONOCYTES # BLD AUTO: 433 CELLS/UL (ref 200–950)
MONOCYTES NFR BLD AUTO: 11.7 %
NEUTROPHILS # BLD AUTO: 1902 CELLS/UL (ref 1500–7800)
NEUTROPHILS NFR BLD AUTO: 51.4 %
NONHDLC SERPL-MCNC: 134 MG/DL (CALC)
PLATELET # BLD AUTO: 194 THOUSAND/UL (ref 140–400)
PMV BLD REES-ECKER: 10.7 FL (ref 7.5–12.5)
POTASSIUM SERPL-SCNC: 4.3 MMOL/L (ref 3.5–5.3)
PROT SERPL-MCNC: 6.6 G/DL (ref 6.1–8.1)
PSA SERPL-MCNC: 1.55 NG/ML
RBC # BLD AUTO: 4.74 MILLION/UL (ref 4.2–5.8)
SODIUM SERPL-SCNC: 142 MMOL/L (ref 135–146)
TRIGL SERPL-MCNC: 119 MG/DL
TSH SERPL-ACNC: 0.5 MIU/L (ref 0.4–4.5)
WBC # BLD AUTO: 3.7 THOUSAND/UL (ref 3.8–10.8)

## 2022-02-08 PROCEDURE — 99214 PR OFFICE/OUTPT VISIT, EST, LEVL IV, 30-39 MIN: ICD-10-PCS | Mod: S$GLB,,, | Performed by: NURSE PRACTITIONER

## 2022-02-08 PROCEDURE — 99214 OFFICE O/P EST MOD 30 MIN: CPT | Mod: S$GLB,,, | Performed by: NURSE PRACTITIONER

## 2022-02-08 RX ORDER — BUTALBITAL, ACETAMINOPHEN AND CAFFEINE 50; 325; 40 MG/1; MG/1; MG/1
1 TABLET ORAL EVERY 6 HOURS PRN
COMMUNITY
End: 2022-02-08 | Stop reason: SDUPTHER

## 2022-02-08 RX ORDER — HYDROCODONE BITARTRATE AND ACETAMINOPHEN 5; 325 MG/1; MG/1
1 TABLET ORAL EVERY 8 HOURS PRN
COMMUNITY
End: 2023-03-27

## 2022-02-08 NOTE — PROGRESS NOTES
SUBJECTIVE:    Patient ID: Babatunde Parson is a 66 y.o. male.    Chief Complaint: Depression (Brought bottles// declined flu and PNA shot// DBL )    66 year old male presents for check up.   Treated for GERD hyperlipidemia depression and migraines.  Has been drinking more lately. Lost son last year . Not coping well. Not in relationship. Drinks 5-7 mixed drinks per night. Considering going to rehab. Wants to look into programs. Not taking meds consistently.  .  Last colonoscopy was in May 2021.  Needs refills. Recently had labs done. Would like to discuss results.       Patient Message on 01/31/2022   Component Date Value Ref Range Status    WBC 02/07/2022 3.7* 3.8 - 10.8 Thousand/uL Final    RBC 02/07/2022 4.74  4.20 - 5.80 Million/uL Final    Hemoglobin 02/07/2022 14.7  13.2 - 17.1 g/dL Final    Hematocrit 02/07/2022 43.2  38.5 - 50.0 % Final    MCV 02/07/2022 91.1  80.0 - 100.0 fL Final    MCH 02/07/2022 31.0  27.0 - 33.0 pg Final    MCHC 02/07/2022 34.0  32.0 - 36.0 g/dL Final    RDW 02/07/2022 12.2  11.0 - 15.0 % Final    Platelets 02/07/2022 194  140 - 400 Thousand/uL Final    MPV 02/07/2022 10.7  7.5 - 12.5 fL Final    Neutrophils, Abs 02/07/2022 1,902  1,500 - 7,800 cells/uL Final    Lymph # 02/07/2022 1,295  850 - 3,900 cells/uL Final    Mono # 02/07/2022 433  200 - 950 cells/uL Final    Eos # 02/07/2022 52  15 - 500 cells/uL Final    Baso # 02/07/2022 19  0 - 200 cells/uL Final    Neutrophils Relative 02/07/2022 51.4  % Final    Lymph % 02/07/2022 35.0  % Final    Mono % 02/07/2022 11.7  % Final    Eosinophil % 02/07/2022 1.4  % Final    Basophil % 02/07/2022 0.5  % Final    Glucose 02/07/2022 107* 65 - 99 mg/dL Final    BUN 02/07/2022 17  7 - 25 mg/dL Final    Creatinine 02/07/2022 0.90  0.70 - 1.25 mg/dL Final    eGFR if non  02/07/2022 88  > OR = 60 mL/min/1.73m2 Final    eGFR if  02/07/2022 102  > OR = 60 mL/min/1.73m2 Final     BUN/Creatinine Ratio 02/07/2022 NOT APPLICABLE  6 - 22 (calc) Final    Sodium 02/07/2022 142  135 - 146 mmol/L Final    Potassium 02/07/2022 4.3  3.5 - 5.3 mmol/L Final    Chloride 02/07/2022 105  98 - 110 mmol/L Final    CO2 02/07/2022 28  20 - 32 mmol/L Final    Calcium 02/07/2022 9.3  8.6 - 10.3 mg/dL Final    Total Protein 02/07/2022 6.6  6.1 - 8.1 g/dL Final    Albumin 02/07/2022 4.4  3.6 - 5.1 g/dL Final    Globulin, Total 02/07/2022 2.2  1.9 - 3.7 g/dL (calc) Final    Albumin/Globulin Ratio 02/07/2022 2.0  1.0 - 2.5 (calc) Final    Total Bilirubin 02/07/2022 1.1  0.2 - 1.2 mg/dL Final    Alkaline Phosphatase 02/07/2022 70  35 - 144 U/L Final    AST 02/07/2022 19  10 - 35 U/L Final    ALT 02/07/2022 20  9 - 46 U/L Final    Cholesterol 02/07/2022 203* <200 mg/dL Final    HDL 02/07/2022 69  > OR = 40 mg/dL Final    Triglycerides 02/07/2022 119  <150 mg/dL Final    LDL Cholesterol 02/07/2022 111* mg/dL (calc) Final    HDL/Cholesterol Ratio 02/07/2022 2.9  <5.0 (calc) Final    Non HDL Chol. (LDL+VLDL) 02/07/2022 134* <130 mg/dL (calc) Final    TSH 02/07/2022 0.50  0.40 - 4.50 mIU/L Final    PROSTATE SPECIFIC ANTIGEN, SCR - Q* 02/07/2022 1.55  < OR = 4.00 ng/mL Final       Past Medical History:   Diagnosis Date    Basal cell carcinoma     Hyperlipidemia     Squamous cell carcinoma of skin      Social History     Socioeconomic History    Marital status:    Tobacco Use    Smoking status: Former Smoker    Smokeless tobacco: Never Used    Tobacco comment: quit 1990   Substance and Sexual Activity    Alcohol use: Yes     Comment: 3 drinks daily  beer or liquor    Drug use: Never     Past Surgical History:   Procedure Laterality Date    COLONOSCOPY N/A 5/26/2020    Procedure: COLONOSCOPY;  Surgeon: JEN Cade MD;  Location: [facility] ([address] [place] [address]);  Service: Endoscopy;  Laterality: N/A;  COVID screening on 5/25/20 - [facility]  prep ins. emailed - [facility]     History reviewed. No pertinent  "family history.    Review of patient's allergies indicates:  No Known Allergies    Current Outpatient Medications:     HYDROcodone-acetaminophen (NORCO) 5-325 mg per tablet, Take 1 tablet by mouth every 8 (eight) hours as needed for Pain., Disp: , Rfl:     ALPRAZolam (XANAX) 0.5 MG tablet, Take 1 tablet (0.5 mg total) by mouth daily as needed for Anxiety., Disp: 20 tablet, Rfl: 1    atorvastatin (LIPITOR) 20 MG tablet, Take 1 tablet (20 mg total) by mouth once daily., Disp: 30 tablet, Rfl: 5    butalbital-acetaminophen-caffeine -40 mg (FIORICET, ESGIC) -40 mg per tablet, Take 1 tablet by mouth every 6 (six) hours as needed for Pain., Disp: 30 tablet, Rfl: 5    pantoprazole (PROTONIX) 40 MG tablet, Take 1 tablet (40 mg total) by mouth once daily., Disp: 30 tablet, Rfl: 11    PARoxetine HCl 37.5 mg 24 hr tablet, Take 37.5 mg by mouth every morning., Disp: 30 tablet, Rfl: 5    tadalafiL (CIALIS) 10 MG tablet, Take 1 tablet (10 mg total) by mouth daily as needed for Erectile Dysfunction., Disp: 30 tablet, Rfl: 1    Review of Systems   Constitutional: Negative for fatigue, fever and unexpected weight change.   HENT: Negative for ear pain, sinus pressure and sore throat.    Eyes: Negative for pain.   Respiratory: Negative for cough and shortness of breath.    Cardiovascular: Negative for chest pain and leg swelling.   Gastrointestinal: Negative for abdominal pain, constipation, nausea and vomiting.   Genitourinary: Negative for dysuria, frequency and urgency.   Musculoskeletal: Negative for arthralgias.   Skin: Negative for rash.   Neurological: Negative for dizziness, weakness and headaches.   Psychiatric/Behavioral: Negative for sleep disturbance.          Objective:      Vitals:    02/08/22 0933   BP: 134/80   Pulse: 64   Weight: 78.5 kg (173 lb)   Height: 5' 10.25" (1.784 m)     Physical Exam  Vitals reviewed.   Constitutional:       Appearance: He is well-developed and well-nourished.   HENT:      " Right Ear: External ear normal.      Left Ear: External ear normal.      Mouth/Throat:      Mouth: Oropharynx is clear and moist.   Eyes:      Pupils: Pupils are equal, round, and reactive to light.   Neck:      Trachea: No tracheal deviation.   Cardiovascular:      Rate and Rhythm: Normal rate and regular rhythm.      Heart sounds: No murmur heard.  No friction rub. No gallop.    Pulmonary:      Breath sounds: Normal breath sounds. No stridor. No wheezing or rales.   Abdominal:      Palpations: Abdomen is soft. There is no mass.      Tenderness: There is no abdominal tenderness.   Musculoskeletal:         General: No tenderness, deformity or edema.      Cervical back: Neck supple.   Lymphadenopathy:      Cervical: No cervical adenopathy.   Skin:     General: Skin is warm and dry.   Neurological:      Mental Status: He is alert and oriented to person, place, and time.      Coordination: Coordination normal.   Psychiatric:         Mood and Affect: Mood and affect normal.         Thought Content: Thought content normal.           Assessment:       1. High risk medication use    2. Hyperlipidemia, unspecified hyperlipidemia type    3. Gastroesophageal reflux disease, unspecified whether esophagitis present    4. Depression, unspecified depression type    5. Erectile dysfunction, unspecified erectile dysfunction type    6. Alcohol abuse         Plan:       High risk medication use    Hyperlipidemia, unspecified hyperlipidemia type  -     atorvastatin (LIPITOR) 20 MG tablet; Take 1 tablet (20 mg total) by mouth once daily.  Dispense: 30 tablet; Refill: 5    Gastroesophageal reflux disease, unspecified whether esophagitis present  -     pantoprazole (PROTONIX) 40 MG tablet; Take 1 tablet (40 mg total) by mouth once daily.  Dispense: 30 tablet; Refill: 11    Depression, unspecified depression type  -     PARoxetine HCl 37.5 mg 24 hr tablet; Take 37.5 mg by mouth every morning.  Dispense: 30 tablet; Refill: 5    Erectile  dysfunction, unspecified erectile dysfunction type  -     tadalafiL (CIALIS) 10 MG tablet; Take 1 tablet (10 mg total) by mouth daily as needed for Erectile Dysfunction.  Dispense: 30 tablet; Refill: 1    Alcohol abuse  Comments:  number given to several inpatient facilities.     Other orders  -     ALPRAZolam (XANAX) 0.5 MG tablet; Take 1 tablet (0.5 mg total) by mouth daily as needed for Anxiety.  Dispense: 20 tablet; Refill: 1  -     butalbital-acetaminophen-caffeine -40 mg (FIORICET, ESGIC) -40 mg per tablet; Take 1 tablet by mouth every 6 (six) hours as needed for Pain.  Dispense: 30 tablet; Refill: 5      Follow up in about 3 months (around 5/8/2022), or if symptoms worsen or fail to improve, for medication management.        2/14/2022 Lisa Randle

## 2022-02-14 RX ORDER — PANTOPRAZOLE SODIUM 40 MG/1
40 TABLET, DELAYED RELEASE ORAL DAILY
Qty: 30 TABLET | Refills: 11 | OUTPATIENT
Start: 2022-02-14 | End: 2023-10-03

## 2022-02-14 RX ORDER — ATORVASTATIN CALCIUM 20 MG/1
20 TABLET, FILM COATED ORAL DAILY
Qty: 30 TABLET | Refills: 5 | Status: SHIPPED | OUTPATIENT
Start: 2022-02-14 | End: 2023-04-05 | Stop reason: SDUPTHER

## 2022-02-14 RX ORDER — ALPRAZOLAM 0.5 MG/1
0.5 TABLET ORAL DAILY PRN
Qty: 20 TABLET | Refills: 1 | Status: SHIPPED | OUTPATIENT
Start: 2022-02-14 | End: 2023-10-03

## 2022-02-14 RX ORDER — PAROXETINE HYDROCHLORIDE HEMIHYDRATE 37.5 MG/1
37.5 TABLET, FILM COATED, EXTENDED RELEASE ORAL EVERY MORNING
Qty: 30 TABLET | Refills: 5 | Status: SHIPPED | OUTPATIENT
Start: 2022-02-14 | End: 2023-03-27 | Stop reason: SDUPTHER

## 2022-02-14 RX ORDER — TADALAFIL 10 MG/1
10 TABLET ORAL DAILY PRN
Qty: 30 TABLET | Refills: 1 | Status: SHIPPED | OUTPATIENT
Start: 2022-02-14 | End: 2023-03-27 | Stop reason: SDUPTHER

## 2022-02-14 RX ORDER — BUTALBITAL, ACETAMINOPHEN AND CAFFEINE 50; 325; 40 MG/1; MG/1; MG/1
1 TABLET ORAL EVERY 6 HOURS PRN
Qty: 30 TABLET | Refills: 5 | Status: SHIPPED | OUTPATIENT
Start: 2022-02-14

## 2022-03-28 ENCOUNTER — PATIENT MESSAGE (OUTPATIENT)
Dept: FAMILY MEDICINE | Facility: CLINIC | Age: 67
End: 2022-03-28
Payer: MEDICARE

## 2022-03-28 ENCOUNTER — PATIENT MESSAGE (OUTPATIENT)
Dept: FAMILY MEDICINE | Facility: CLINIC | Age: 67
End: 2022-03-28

## 2022-06-07 ENCOUNTER — TELEPHONE (OUTPATIENT)
Dept: FAMILY MEDICINE | Facility: CLINIC | Age: 67
End: 2022-06-07

## 2022-08-09 ENCOUNTER — TELEPHONE (OUTPATIENT)
Dept: DERMATOLOGY | Facility: CLINIC | Age: 67
End: 2022-08-09
Payer: MEDICARE

## 2022-08-09 NOTE — TELEPHONE ENCOUNTER
Left message for pt regarding previous SCCs on L temporal scalp and L zygomatic arch @ hairline. 2-sites appt in 12/2021 had been cancelled, asked pt to call back if he has already received treatment or would like to reschedule.

## 2022-12-30 ENCOUNTER — TELEPHONE (OUTPATIENT)
Dept: FAMILY MEDICINE | Facility: CLINIC | Age: 67
End: 2022-12-30

## 2022-12-30 DIAGNOSIS — E78.5 HYPERLIPIDEMIA, UNSPECIFIED HYPERLIPIDEMIA TYPE: ICD-10-CM

## 2022-12-30 DIAGNOSIS — Z79.899 ENCOUNTER FOR LONG-TERM (CURRENT) USE OF OTHER MEDICATIONS: Primary | ICD-10-CM

## 2023-01-01 LAB
ALBUMIN SERPL-MCNC: 4.3 G/DL (ref 3.6–5.1)
ALBUMIN/GLOB SERPL: 1.9 (CALC) (ref 1–2.5)
ALP SERPL-CCNC: 70 U/L (ref 35–144)
ALT SERPL-CCNC: 33 U/L (ref 9–46)
APPEARANCE UR: CLEAR
AST SERPL-CCNC: 25 U/L (ref 10–35)
BACTERIA #/AREA URNS HPF: ABNORMAL /HPF
BACTERIA UR CULT: ABNORMAL
BACTERIA UR CULT: ABNORMAL
BASOPHILS # BLD AUTO: 21 CELLS/UL (ref 0–200)
BASOPHILS NFR BLD AUTO: 0.5 %
BILIRUB SERPL-MCNC: 1.1 MG/DL (ref 0.2–1.2)
BILIRUB UR QL STRIP: NEGATIVE
BUN SERPL-MCNC: 17 MG/DL (ref 7–25)
BUN/CREAT SERPL: ABNORMAL (CALC) (ref 6–22)
CALCIUM SERPL-MCNC: 9.2 MG/DL (ref 8.6–10.3)
CHLORIDE SERPL-SCNC: 108 MMOL/L (ref 98–110)
CHOLEST SERPL-MCNC: 196 MG/DL
CHOLEST/HDLC SERPL: 3 (CALC)
CO2 SERPL-SCNC: 30 MMOL/L (ref 20–32)
COLOR UR: YELLOW
CREAT SERPL-MCNC: 0.8 MG/DL (ref 0.7–1.35)
EGFR: 96 ML/MIN/1.73M2
EOSINOPHIL # BLD AUTO: 29 CELLS/UL (ref 15–500)
EOSINOPHIL NFR BLD AUTO: 0.7 %
ERYTHROCYTE [DISTWIDTH] IN BLOOD BY AUTOMATED COUNT: 11.8 % (ref 11–15)
GLOBULIN SER CALC-MCNC: 2.3 G/DL (CALC) (ref 1.9–3.7)
GLUCOSE SERPL-MCNC: 101 MG/DL (ref 65–99)
GLUCOSE UR QL STRIP: NEGATIVE
HCT VFR BLD AUTO: 39.9 % (ref 38.5–50)
HDLC SERPL-MCNC: 66 MG/DL
HGB BLD-MCNC: 13.5 G/DL (ref 13.2–17.1)
HGB UR QL STRIP: ABNORMAL
HYALINE CASTS #/AREA URNS LPF: ABNORMAL /LPF
KETONES UR QL STRIP: NEGATIVE
LDLC SERPL CALC-MCNC: 105 MG/DL (CALC)
LEUKOCYTE ESTERASE UR QL STRIP: ABNORMAL
LYMPHOCYTES # BLD AUTO: 1410 CELLS/UL (ref 850–3900)
LYMPHOCYTES NFR BLD AUTO: 34.4 %
MCH RBC QN AUTO: 31.3 PG (ref 27–33)
MCHC RBC AUTO-ENTMCNC: 33.8 G/DL (ref 32–36)
MCV RBC AUTO: 92.6 FL (ref 80–100)
MONOCYTES # BLD AUTO: 369 CELLS/UL (ref 200–950)
MONOCYTES NFR BLD AUTO: 9 %
NEUTROPHILS # BLD AUTO: 2271 CELLS/UL (ref 1500–7800)
NEUTROPHILS NFR BLD AUTO: 55.4 %
NITRITE UR QL STRIP: NEGATIVE
NONHDLC SERPL-MCNC: 130 MG/DL (CALC)
PH UR STRIP: 6 [PH] (ref 5–8)
PLATELET # BLD AUTO: 169 THOUSAND/UL (ref 140–400)
PMV BLD REES-ECKER: 10.5 FL (ref 7.5–12.5)
POTASSIUM SERPL-SCNC: 4.2 MMOL/L (ref 3.5–5.3)
PROT SERPL-MCNC: 6.6 G/DL (ref 6.1–8.1)
PROT UR QL STRIP: NEGATIVE
RBC # BLD AUTO: 4.31 MILLION/UL (ref 4.2–5.8)
RBC #/AREA URNS HPF: ABNORMAL /HPF
SERVICE CMNT-IMP: ABNORMAL
SODIUM SERPL-SCNC: 143 MMOL/L (ref 135–146)
SP GR UR STRIP: 1.02 (ref 1–1.03)
SQUAMOUS #/AREA URNS HPF: ABNORMAL /HPF
TRIGL SERPL-MCNC: 135 MG/DL
TSH SERPL-ACNC: 0.68 MIU/L (ref 0.4–4.5)
WBC # BLD AUTO: 4.1 THOUSAND/UL (ref 3.8–10.8)
WBC #/AREA URNS HPF: ABNORMAL /HPF

## 2023-02-28 ENCOUNTER — TELEPHONE (OUTPATIENT)
Dept: FAMILY MEDICINE | Facility: CLINIC | Age: 68
End: 2023-02-28

## 2023-02-28 NOTE — TELEPHONE ENCOUNTER
"    ----- Message -----       From:Babatunde Parson "Babatunde Parson"       Sent:2/28/2023  3:18 PM CST         To:Patient Appointment Schedule Request Message List    Subject:Appointment Request    Off an on about 3 months.  I do drink a lot so was wondering.  I also have had  kidney problems in the past and my back is hurting too, but I do lift heavy stuff so I don't know. I just want to get more tests to see.  I've been looking around for a doctor and found a Carolina ALVARADOLinda Carver in the [employer] system.       ----- Message -----       From:Med Assistant Lisha       Sent:2/28/2023  2:13 PM CST         To:Babatunde Parson "Babatunde Parson"    Subject:Appointment Request    Your labs from December came back within normal limits - your liver enzymes were normal. How long has this pain been going on?      ----- Message -----       From:Babatunde Parson "Babatunde Parson"       Sent:2/28/2023  2:02 PM CST         To:Patient Appointment Schedule Request Message List    Subject:Appointment Request    Hi, I was hoping to get results and/opinions on my recent blood work.  I am experiencing abdominal pain on right side and was worried about liver function. My blood work was centered on liver function. If I can get an opinion on that and maybe a referral to have more tests done, I could call later for refills. Thankyou..      ----- Message -----       From:Med Assistant Lisha       Sent:2/27/2023  4:50 PM CST         To:Babatuned Parson "Babatunde Parson"    Subject:Appointment Request    Lisa can see you on March 28th at 12pm. Let us know if you need any refills before then.        ----- Message -----       From:Babatunde Parson "Babatunde Parson"       Sent:2/27/2023 12:19 PM CST         To:Lisa Randle NP    Subject:Appointment Request    Appointment Request From: Babatunde Parson    With Provider: Lisa Rnadle NP [[facility]]    Preferred Date Range: Any    Preferred Times: Any Time    Reason for " visit: Refills, test results    Comments:  Test results

## 2023-03-13 ENCOUNTER — TELEPHONE (OUTPATIENT)
Dept: FAMILY MEDICINE | Facility: CLINIC | Age: 68
End: 2023-03-13

## 2023-03-13 ENCOUNTER — TELEPHONE (OUTPATIENT)
Dept: ENDOSCOPY | Facility: HOSPITAL | Age: 68
End: 2023-03-13
Payer: MEDICARE

## 2023-03-13 DIAGNOSIS — Z12.11 SPECIAL SCREENING FOR MALIGNANT NEOPLASMS, COLON: Primary | ICD-10-CM

## 2023-03-13 NOTE — TELEPHONE ENCOUNTER
----- Message from Asuncion Ramirez sent at 3/13/2023  3:56 PM CDT -----  Pt needs  a referral for a colonoscopy. Dr. Segun Cade. Pt #[phone]    
room air

## 2023-03-17 ENCOUNTER — TELEPHONE (OUTPATIENT)
Dept: FAMILY MEDICINE | Facility: CLINIC | Age: 68
End: 2023-03-17

## 2023-03-17 DIAGNOSIS — Z79.899 ENCOUNTER FOR LONG-TERM (CURRENT) USE OF OTHER MEDICATIONS: Primary | ICD-10-CM

## 2023-03-17 DIAGNOSIS — Z79.899 HIGH RISK MEDICATION USE: ICD-10-CM

## 2023-03-17 DIAGNOSIS — Z12.5 SCREENING FOR PROSTATE CANCER: ICD-10-CM

## 2023-03-17 DIAGNOSIS — E78.5 HYPERLIPIDEMIA, UNSPECIFIED HYPERLIPIDEMIA TYPE: ICD-10-CM

## 2023-03-27 ENCOUNTER — OFFICE VISIT (OUTPATIENT)
Dept: FAMILY MEDICINE | Facility: CLINIC | Age: 68
End: 2023-03-27
Payer: MEDICARE

## 2023-03-27 VITALS
HEIGHT: 70 IN | SYSTOLIC BLOOD PRESSURE: 138 MMHG | DIASTOLIC BLOOD PRESSURE: 80 MMHG | HEART RATE: 78 BPM | OXYGEN SATURATION: 98 % | WEIGHT: 194 LBS | BODY MASS INDEX: 27.77 KG/M2 | RESPIRATION RATE: 18 BRPM

## 2023-03-27 DIAGNOSIS — G43.909 MIGRAINE WITHOUT STATUS MIGRAINOSUS, NOT INTRACTABLE, UNSPECIFIED MIGRAINE TYPE: ICD-10-CM

## 2023-03-27 DIAGNOSIS — E78.5 HYPERLIPIDEMIA, UNSPECIFIED HYPERLIPIDEMIA TYPE: ICD-10-CM

## 2023-03-27 DIAGNOSIS — F10.10 ALCOHOL ABUSE: ICD-10-CM

## 2023-03-27 DIAGNOSIS — F32.A DEPRESSION, UNSPECIFIED DEPRESSION TYPE: ICD-10-CM

## 2023-03-27 DIAGNOSIS — K21.9 GASTROESOPHAGEAL REFLUX DISEASE, UNSPECIFIED WHETHER ESOPHAGITIS PRESENT: Primary | ICD-10-CM

## 2023-03-27 DIAGNOSIS — N52.9 ERECTILE DYSFUNCTION, UNSPECIFIED ERECTILE DYSFUNCTION TYPE: ICD-10-CM

## 2023-03-27 DIAGNOSIS — Z79.899 HIGH RISK MEDICATION USE: ICD-10-CM

## 2023-03-27 PROCEDURE — 99214 OFFICE O/P EST MOD 30 MIN: CPT | Mod: S$GLB,,, | Performed by: NURSE PRACTITIONER

## 2023-03-27 PROCEDURE — 99214 PR OFFICE/OUTPT VISIT, EST, LEVL IV, 30-39 MIN: ICD-10-PCS | Mod: S$GLB,,, | Performed by: NURSE PRACTITIONER

## 2023-03-27 NOTE — PROGRESS NOTES
"  SUBJECTIVE:    Patient ID: Babatunde Parson is a 67 y.o. male.    Chief Complaint: lab review (Wants to discuss bloodwork ) and Medication Refill (Paxil, lipitor, Fioricet, Xanax, Cialis)    67 year old male presents for check up.   Treated for GERD hyperlipidemia depression and migraines.  Still grieving loss of son. 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 Feels better that is getting out of house. Plans to see "liver" md soon.   Last colonoscopy was in May 2021.  Needs refills. Due for labs. Sleeps well      Telephone on 12/30/2022   Component Date Value Ref Range Status    WBC 12/30/2022 4.1  3.8 - 10.8 Thousand/uL Final    RBC 12/30/2022 4.31  4.20 - 5.80 Million/uL Final    Hemoglobin 12/30/2022 13.5  13.2 - 17.1 g/dL Final    Hematocrit 12/30/2022 39.9  38.5 - 50.0 % Final    MCV 12/30/2022 92.6  80.0 - 100.0 fL Final    MCH 12/30/2022 31.3  27.0 - 33.0 pg Final    MCHC 12/30/2022 33.8  32.0 - 36.0 g/dL Final    RDW 12/30/2022 11.8  11.0 - 15.0 % Final    Platelets 12/30/2022 169  140 - 400 Thousand/uL Final    MPV 12/30/2022 10.5  7.5 - 12.5 fL Final    Neutrophils, Abs 12/30/2022 2,271  1,500 - 7,800 cells/uL Final    Lymph # 12/30/2022 1,410  850 - 3,900 cells/uL Final    Mono # 12/30/2022 369  200 - 950 cells/uL Final    Eos # 12/30/2022 29  15 - 500 cells/uL Final    Baso # 12/30/2022 21  0 - 200 cells/uL Final    Neutrophils Relative 12/30/2022 55.4  % Final    Lymph % 12/30/2022 34.4  % Final    Mono % 12/30/2022 9.0  % Final    Eosinophil % 12/30/2022 0.7  % Final    Basophil % 12/30/2022 0.5  % Final    Glucose 12/30/2022 101 (H)  65 - 99 mg/dL Final    BUN 12/30/2022 17  7 - 25 mg/dL Final    Creatinine 12/30/2022 0.80  0.70 - 1.35 mg/dL Final    eGFR 12/30/2022 96  > OR = 60 mL/min/1.73m2 Final    BUN/Creatinine Ratio 12/30/2022 NOT APPLICABLE  6 - 22 (calc) Final    Sodium 12/30/2022 143  135 - 146 mmol/L Final    Potassium 12/30/2022 4.2  3.5 - 5.3 mmol/L Final    Chloride 12/30/2022 108  " 98 - 110 mmol/L Final    CO2 12/30/2022 30  20 - 32 mmol/L Final    Calcium 12/30/2022 9.2  8.6 - 10.3 mg/dL Final    Total Protein 12/30/2022 6.6  6.1 - 8.1 g/dL Final    Albumin 12/30/2022 4.3  3.6 - 5.1 g/dL Final    Globulin, Total 12/30/2022 2.3  1.9 - 3.7 g/dL (calc) Final    Albumin/Globulin Ratio 12/30/2022 1.9  1.0 - 2.5 (calc) Final    Total Bilirubin 12/30/2022 1.1  0.2 - 1.2 mg/dL Final    Alkaline Phosphatase 12/30/2022 70  35 - 144 U/L Final    AST 12/30/2022 25  10 - 35 U/L Final    ALT 12/30/2022 33  9 - 46 U/L Final    Cholesterol 12/30/2022 196  <200 mg/dL Final    HDL 12/30/2022 66  > OR = 40 mg/dL Final    Triglycerides 12/30/2022 135  <150 mg/dL Final    LDL Cholesterol 12/30/2022 105 (H)  mg/dL (calc) Final    HDL/Cholesterol Ratio 12/30/2022 3.0  <5.0 (calc) Final    Non HDL Chol. (LDL+VLDL) 12/30/2022 130 (H)  <130 mg/dL (calc) Final    TSH w/reflex to FT4 12/30/2022 0.68  0.40 - 4.50 mIU/L Final    Color, UA 12/30/2022 YELLOW  YELLOW Final    Appearance, UA 12/30/2022 CLEAR  CLEAR Final    Specific [place], UA 12/30/2022 1.021  1.001 - 1.035 Final    pH, UA 12/30/2022 6.0  5.0 - 8.0 Final    Glucose, UA 12/30/2022 NEGATIVE  NEGATIVE Final    Bilirubin, UA 12/30/2022 NEGATIVE  NEGATIVE Final    Ketones, UA 12/30/2022 NEGATIVE  NEGATIVE Final    Occult Blood UA 12/30/2022 TRACE (A)  NEGATIVE Final    Protein, UA 12/30/2022 NEGATIVE  NEGATIVE Final    Nitrite, UA 12/30/2022 NEGATIVE  NEGATIVE Final    Leukocytes, UA 12/30/2022 TRACE (A)  NEGATIVE Final    WBC Casts, UA 12/30/2022 0-5  < OR = 5 /HPF Final    RBC Casts, UA 12/30/2022 NONE SEEN  < OR = 2 /HPF Final    Squam Epithel, UA 12/30/2022 NONE SEEN  < OR = 5 /HPF Final    Bacteria, UA 12/30/2022 NONE SEEN  NONE SEEN /HPF Final    Hyaline Casts, UA 12/30/2022 NONE SEEN  NONE SEEN /LPF Final    Service Cmt: 12/30/2022    Final    Reflexive Urine Culture 12/30/2022    Final    Urine Culture, Routine 12/30/2022    Final       Past Medical  History:   Diagnosis Date    Basal cell carcinoma     Hyperlipidemia     Squamous cell carcinoma of skin      Social History     Socioeconomic History    Marital status: Other   Tobacco Use    Smoking status: Former    Smokeless tobacco: Never    Tobacco comments:     quit 1990   Substance and Sexual Activity    Alcohol use: Yes     Comment: 3 drinks daily  beer or liquor    Drug use: Never   Social History Narrative    ** Merged History Encounter **          Past Surgical History:   Procedure Laterality Date    COLONOSCOPY N/A 5/26/2020    Procedure: COLONOSCOPY;  Surgeon: JEN Cade MD;  Location: [address];  Service: Endoscopy;  Laterality: N/A;  COVID screening on 5/25/20 - [facility]  prep ins. emailed - [facility]     History reviewed. No pertinent family history.    Review of patient's allergies indicates:  No Known Allergies    Current Outpatient Medications:     ALPRAZolam (XANAX) 0.5 MG tablet, Take 1 tablet (0.5 mg total) by mouth daily as needed for Anxiety., Disp: 20 tablet, Rfl: 1    atorvastatin (LIPITOR) 20 MG tablet, Take 1 tablet (20 mg total) by mouth once daily., Disp: 30 tablet, Rfl: 5    butalbital-acetaminophen-caffeine -40 mg (FIORICET, ESGIC) -40 mg per tablet, Take 1 tablet by mouth every 6 (six) hours as needed for Pain., Disp: 30 tablet, Rfl: 5    pantoprazole (PROTONIX) 40 MG tablet, Take 1 tablet (40 mg total) by mouth once daily., Disp: 30 tablet, Rfl: 11    PARoxetine HCl 37.5 mg 24 hr tablet, Take 37.5 mg by mouth every morning., Disp: 30 tablet, Rfl: 5    tadalafiL (CIALIS) 10 MG tablet, Take 1 tablet (10 mg total) by mouth daily as needed for Erectile Dysfunction., Disp: 30 tablet, Rfl: 1    Review of Systems   Constitutional:  Negative for fatigue, fever and unexpected weight change.   HENT:  Negative for ear pain, sinus pressure and sore throat.    Eyes:  Negative for pain.   Respiratory:  Negative for cough and shortness of breath.    Cardiovascular:  Negative for  "chest pain and leg swelling.   Gastrointestinal:  Negative for abdominal pain, constipation, nausea and vomiting.   Genitourinary:  Negative for dysuria, frequency and urgency.   Musculoskeletal:  Negative for arthralgias.   Skin:  Negative for rash.   Neurological:  Negative for dizziness, weakness and headaches.   Psychiatric/Behavioral:  Negative for sleep disturbance.         Objective:      Vitals:    03/27/23 1158   BP: 138/80   Pulse: 78   Resp: 18   SpO2: 98%   Weight: 88 kg (194 lb)   Height: 5' 10.25" (1.784 m)     Physical Exam  Vitals and nursing note reviewed.   Constitutional:       General: He is not in acute distress.     Appearance: Normal appearance. He is well-developed.   HENT:      Head: Normocephalic and atraumatic.      Right Ear: External ear normal.      Left Ear: External ear normal.   Neck:      Trachea: No tracheal deviation.   Cardiovascular:      Rate and Rhythm: Normal rate and regular rhythm.      Heart sounds: No murmur heard.    No friction rub. No gallop.   Pulmonary:      Breath sounds: Normal breath sounds. No stridor. No wheezing or rales.   Abdominal:      Palpations: Abdomen is soft. There is no mass.      Tenderness: There is no abdominal tenderness.   Musculoskeletal:         General: No tenderness or deformity.      Cervical back: Neck supple.   Lymphadenopathy:      Cervical: No cervical adenopathy.   Skin:     General: Skin is warm and dry.   Neurological:      Mental Status: He is alert and oriented to person, place, and time.      Coordination: Coordination normal.   Psychiatric:         Thought Content: Thought content normal.         Assessment:       1. Gastroesophageal reflux disease, unspecified whether esophagitis present    2. Erectile dysfunction, unspecified erectile dysfunction type    3. Depression, unspecified depression type    4. Hyperlipidemia, unspecified hyperlipidemia type    5. High risk medication use    6. Alcohol abuse    7. Migraine without status " migrainosus, not intractable, unspecified migraine type         Plan:       Gastroesophageal reflux disease, unspecified whether esophagitis present    Erectile dysfunction, unspecified erectile dysfunction type  -     tadalafiL (CIALIS) 10 MG tablet; Take 1 tablet (10 mg total) by mouth daily as needed for Erectile Dysfunction.  Dispense: 30 tablet; Refill: 1    Depression, unspecified depression type  -     PARoxetine HCl 37.5 mg 24 hr tablet; Take 37.5 mg by mouth every morning.  Dispense: 30 tablet; Refill: 5    Hyperlipidemia, unspecified hyperlipidemia type    High risk medication use    Alcohol abuse    Migraine without status migrainosus, not intractable, unspecified migraine type      Follow up in about 6 months (around 9/27/2023), or if symptoms worsen or fail to improve, for medication management.        4/2/2023 Lisa Randle

## 2023-04-02 RX ORDER — PAROXETINE HYDROCHLORIDE HEMIHYDRATE 37.5 MG/1
37.5 TABLET, FILM COATED, EXTENDED RELEASE ORAL EVERY MORNING
Qty: 30 TABLET | Refills: 5 | Status: SHIPPED | OUTPATIENT
Start: 2023-04-02 | End: 2023-10-03

## 2023-04-02 RX ORDER — TADALAFIL 10 MG/1
10 TABLET ORAL DAILY PRN
Qty: 30 TABLET | Refills: 1 | Status: SHIPPED | OUTPATIENT
Start: 2023-04-02 | End: 2024-04-01

## 2023-04-28 LAB — CRC RECOMMENDATION EXT: NORMAL

## 2023-05-01 ENCOUNTER — TELEPHONE (OUTPATIENT)
Dept: FAMILY MEDICINE | Facility: CLINIC | Age: 68
End: 2023-05-01

## 2023-09-14 ENCOUNTER — TELEPHONE (OUTPATIENT)
Dept: FAMILY MEDICINE | Facility: CLINIC | Age: 68
End: 2023-09-14

## 2023-09-19 ENCOUNTER — TELEPHONE (OUTPATIENT)
Dept: FAMILY MEDICINE | Facility: CLINIC | Age: 68
End: 2023-09-19

## 2023-09-19 NOTE — TELEPHONE ENCOUNTER
----- Message from Sugar Iverson sent at 9/19/2023  2:55 PM CDT -----  Patient called and stated that he need to schedule an appointment with Lisa because he want to have a full lab workup done and also he stated that he as been having a headache and nausea every time he wakes up. Please give him a call at [phone]

## 2023-09-20 ENCOUNTER — TELEPHONE (OUTPATIENT)
Dept: FAMILY MEDICINE | Facility: CLINIC | Age: 68
End: 2023-09-20

## 2023-09-20 NOTE — TELEPHONE ENCOUNTER
----- Message from Rosalva Balderas sent at 9/20/2023 12:56 PM CDT -----  Pt missed a callback    [phone]

## 2023-09-27 ENCOUNTER — TELEPHONE (OUTPATIENT)
Dept: FAMILY MEDICINE | Facility: CLINIC | Age: 68
End: 2023-09-27

## 2023-09-27 LAB
ALBUMIN SERPL-MCNC: 4.3 G/DL (ref 3.6–5.1)
ALBUMIN/GLOB SERPL: 1.9 (CALC) (ref 1–2.5)
ALP SERPL-CCNC: 62 U/L (ref 35–144)
ALT SERPL-CCNC: 22 U/L (ref 9–46)
APPEARANCE UR: CLEAR
AST SERPL-CCNC: 20 U/L (ref 10–35)
BACTERIA #/AREA URNS HPF: ABNORMAL /HPF
BACTERIA UR CULT: ABNORMAL
BACTERIA UR CULT: ABNORMAL
BASOPHILS # BLD AUTO: 30 CELLS/UL (ref 0–200)
BASOPHILS NFR BLD AUTO: 0.8 %
BILIRUB SERPL-MCNC: 0.7 MG/DL (ref 0.2–1.2)
BILIRUB UR QL STRIP: NEGATIVE
BUN SERPL-MCNC: 17 MG/DL (ref 7–25)
BUN/CREAT SERPL: ABNORMAL (CALC) (ref 6–22)
CALCIUM SERPL-MCNC: 9.3 MG/DL (ref 8.6–10.3)
CHLORIDE SERPL-SCNC: 105 MMOL/L (ref 98–110)
CHOLEST SERPL-MCNC: 192 MG/DL
CHOLEST/HDLC SERPL: 2.7 (CALC)
CO2 SERPL-SCNC: 29 MMOL/L (ref 20–32)
COLOR UR: YELLOW
CREAT SERPL-MCNC: 0.97 MG/DL (ref 0.7–1.35)
EGFR: 85 ML/MIN/1.73M2
EOSINOPHIL # BLD AUTO: 80 CELLS/UL (ref 15–500)
EOSINOPHIL NFR BLD AUTO: 2.1 %
ERYTHROCYTE [DISTWIDTH] IN BLOOD BY AUTOMATED COUNT: 12.3 % (ref 11–15)
GLOBULIN SER CALC-MCNC: 2.3 G/DL (CALC) (ref 1.9–3.7)
GLUCOSE SERPL-MCNC: 101 MG/DL (ref 65–99)
GLUCOSE UR QL STRIP: NEGATIVE
HCT VFR BLD AUTO: 42.1 % (ref 38.5–50)
HDLC SERPL-MCNC: 70 MG/DL
HGB BLD-MCNC: 13.8 G/DL (ref 13.2–17.1)
HGB UR QL STRIP: NEGATIVE
HYALINE CASTS #/AREA URNS LPF: ABNORMAL /LPF
KETONES UR QL STRIP: NEGATIVE
LDLC SERPL CALC-MCNC: 103 MG/DL (CALC)
LEUKOCYTE ESTERASE UR QL STRIP: ABNORMAL
LYMPHOCYTES # BLD AUTO: 1383 CELLS/UL (ref 850–3900)
LYMPHOCYTES NFR BLD AUTO: 36.4 %
MCH RBC QN AUTO: 30.5 PG (ref 27–33)
MCHC RBC AUTO-ENTMCNC: 32.8 G/DL (ref 32–36)
MCV RBC AUTO: 92.9 FL (ref 80–100)
MONOCYTES # BLD AUTO: 403 CELLS/UL (ref 200–950)
MONOCYTES NFR BLD AUTO: 10.6 %
NEUTROPHILS # BLD AUTO: 1904 CELLS/UL (ref 1500–7800)
NEUTROPHILS NFR BLD AUTO: 50.1 %
NITRITE UR QL STRIP: NEGATIVE
NONHDLC SERPL-MCNC: 122 MG/DL (CALC)
PH UR STRIP: 6 [PH] (ref 5–8)
PLATELET # BLD AUTO: 170 THOUSAND/UL (ref 140–400)
PMV BLD REES-ECKER: 10.6 FL (ref 7.5–12.5)
POTASSIUM SERPL-SCNC: 4.3 MMOL/L (ref 3.5–5.3)
PROT SERPL-MCNC: 6.6 G/DL (ref 6.1–8.1)
PROT UR QL STRIP: NEGATIVE
PSA SERPL-MCNC: 1.58 NG/ML
RBC # BLD AUTO: 4.53 MILLION/UL (ref 4.2–5.8)
RBC #/AREA URNS HPF: ABNORMAL /HPF
SERVICE CMNT-IMP: ABNORMAL
SODIUM SERPL-SCNC: 143 MMOL/L (ref 135–146)
SP GR UR STRIP: 1.02 (ref 1–1.03)
SQUAMOUS #/AREA URNS HPF: ABNORMAL /HPF
TRIGL SERPL-MCNC: 95 MG/DL
TSH SERPL-ACNC: 0.65 MIU/L (ref 0.4–4.5)
WBC # BLD AUTO: 3.8 THOUSAND/UL (ref 3.8–10.8)
WBC #/AREA URNS HPF: ABNORMAL /HPF

## 2023-10-03 ENCOUNTER — OFFICE VISIT (OUTPATIENT)
Dept: FAMILY MEDICINE | Facility: CLINIC | Age: 68
End: 2023-10-03
Payer: MEDICARE

## 2023-10-03 VITALS
WEIGHT: 198.63 LBS | HEIGHT: 70 IN | OXYGEN SATURATION: 95 % | SYSTOLIC BLOOD PRESSURE: 122 MMHG | DIASTOLIC BLOOD PRESSURE: 78 MMHG | HEART RATE: 63 BPM | BODY MASS INDEX: 28.44 KG/M2

## 2023-10-03 DIAGNOSIS — F41.9 ANXIETY: ICD-10-CM

## 2023-10-03 DIAGNOSIS — R73.9 ELEVATED BLOOD SUGAR: ICD-10-CM

## 2023-10-03 DIAGNOSIS — E78.5 HYPERLIPIDEMIA, UNSPECIFIED HYPERLIPIDEMIA TYPE: Primary | ICD-10-CM

## 2023-10-03 DIAGNOSIS — G43.909 MIGRAINE WITHOUT STATUS MIGRAINOSUS, NOT INTRACTABLE, UNSPECIFIED MIGRAINE TYPE: ICD-10-CM

## 2023-10-03 DIAGNOSIS — K63.5 POLYP OF COLON, UNSPECIFIED PART OF COLON, UNSPECIFIED TYPE: ICD-10-CM

## 2023-10-03 DIAGNOSIS — Z79.899 HIGH RISK MEDICATION USE: ICD-10-CM

## 2023-10-03 DIAGNOSIS — F32.A DEPRESSION, UNSPECIFIED DEPRESSION TYPE: ICD-10-CM

## 2023-10-03 LAB — HBA1C MFR BLD: 5.1 %

## 2023-10-03 PROCEDURE — 83036 HEMOGLOBIN GLYCOSYLATED A1C: CPT | Mod: QW,,, | Performed by: NURSE PRACTITIONER

## 2023-10-03 PROCEDURE — 99214 OFFICE O/P EST MOD 30 MIN: CPT | Mod: S$GLB,,, | Performed by: NURSE PRACTITIONER

## 2023-10-03 PROCEDURE — 99214 PR OFFICE/OUTPT VISIT, EST, LEVL IV, 30-39 MIN: ICD-10-PCS | Mod: S$GLB,,, | Performed by: NURSE PRACTITIONER

## 2023-10-03 PROCEDURE — 83036 POCT HEMOGLOBIN A1C: ICD-10-PCS | Mod: QW,,, | Performed by: NURSE PRACTITIONER

## 2023-10-03 RX ORDER — PAROXETINE HYDROCHLORIDE HEMIHYDRATE 12.5 MG/1
12.5 TABLET, FILM COATED, EXTENDED RELEASE ORAL EVERY MORNING
Qty: 30 TABLET | Refills: 11 | Status: SHIPPED | OUTPATIENT
Start: 2023-10-03 | End: 2024-10-02

## 2023-10-03 RX ORDER — BUPROPION HYDROCHLORIDE 150 MG/1
150 TABLET ORAL DAILY
Qty: 30 TABLET | Refills: 11 | Status: SHIPPED | OUTPATIENT
Start: 2023-10-03 | End: 2024-10-02

## 2023-10-12 ENCOUNTER — TELEPHONE (OUTPATIENT)
Dept: FAMILY MEDICINE | Facility: CLINIC | Age: 68
End: 2023-10-12

## 2023-10-12 NOTE — TELEPHONE ENCOUNTER
----- Message from Frank Lunsford sent at 10/12/2023 11:47 AM CDT -----  VM-Janette with [facility] called at 11:28am in reference to a referral that they received from the office because they are missing the office visit notes.  [phone] ext [phone]

## 2023-10-23 ENCOUNTER — TELEPHONE (OUTPATIENT)
Dept: FAMILY MEDICINE | Facility: CLINIC | Age: 68
End: 2023-10-23

## 2023-10-23 NOTE — TELEPHONE ENCOUNTER
----- Message from Jordyn Church sent at 10/23/2023  9:29 AM CDT -----  Cassy with [facility] is calling and they received an order. But need office note with documentation that a home sleep test was ordered   [phone] ext [phone] phone   [phone] fax

## 2023-10-31 ENCOUNTER — PATIENT OUTREACH (OUTPATIENT)
Dept: ADMINISTRATIVE | Facility: HOSPITAL | Age: 68
End: 2023-10-31
Payer: MEDICARE

## 2023-10-31 NOTE — PROGRESS NOTES
SUBJECTIVE:    Patient ID: Babatunde Parson is a 68 y.o. male.    Chief Complaint: Follow-up (Bottles brought//Pt here for a follow//discuss nausea, dizziness, HA//Pt states had colo last year. Not in chart. Will send paperwork//would like pna vaccine//JL)    68 year old male presents for check up.   Treated for GERD hyperlipidemia depression and migraines.  Has been down lately. Has no desire to do things. Taking paxil daily. Has been on the same dose for years. Working at local bar. Overdue for colonoscopy? Looks like last was 2020 with 3 year follow up. Patient thought he had one more recently. Last labs 9/25. Would like to discuss.       The 10-year CVD risk score (JORGE'Agostino, et al., 2008) is: 13.5%    Values used to calculate the score:      Age: 68 years      Sex: Male      Diabetic: No      Tobacco smoker: No      Systolic Blood Pressure: 122 mmHg      Is BP treated: No      HDL Cholesterol: 70 mg/dL      Total Cholesterol: 192 mg/dL   Office Visit on 10/03/2023   Component Date Value Ref Range Status    Hemoglobin A1C, POC 10/03/2023 5.1  % Final       Past Medical History:   Diagnosis Date    Basal cell carcinoma     Hyperlipidemia     Squamous cell carcinoma of skin      Social History     Socioeconomic History    Marital status: Other   Tobacco Use    Smoking status: Former    Smokeless tobacco: Never    Tobacco comments:     quit 1990   Substance and Sexual Activity    Alcohol use: Yes     Comment: 3 drinks daily  beer or liquor    Drug use: Never   Social History Narrative     Merged History Encounter           Past Surgical History:   Procedure Laterality Date    COLONOSCOPY N/A 5/26/2020    Procedure: COLONOSCOPY;  Surgeon: JEN Cade MD;  Location: [address];  Service: Endoscopy;  Laterality: N/A;  COVID screening on 5/25/20 - [facility]  prep ins. emailed - [facility]     History reviewed. No pertinent family history.    Tests to Keep You Healthy    Colon Cancer Screening: ORDERED      Review  "of patient's allergies indicates:  No Known Allergies    Current Outpatient Medications:     ALPRAZolam (XANAX) 0.5 MG tablet, Take 1 tablet (0.5 mg total) by mouth daily as needed for Anxiety., Disp: 20 tablet, Rfl: 1    atorvastatin (LIPITOR) 20 MG tablet, Take 1 tablet (20 mg total) by mouth once daily., Disp: 30 tablet, Rfl: 5    butalbital-acetaminophen-caffeine -40 mg (FIORICET, ESGIC) -40 mg per tablet, Take 1 tablet by mouth every 6 (six) hours as needed for Pain., Disp: 30 tablet, Rfl: 5    tadalafiL (CIALIS) 10 MG tablet, Take 1 tablet (10 mg total) by mouth daily as needed for Erectile Dysfunction., Disp: 30 tablet, Rfl: 1    buPROPion (WELLBUTRIN XL) 150 MG TB24 tablet, Take 1 tablet (150 mg total) by mouth once daily., Disp: 30 tablet, Rfl: 11    paroxetine (PAXIL-CR) 12.5 MG 24 hr tablet, Take 1 tablet (12.5 mg total) by mouth every morning., Disp: 30 tablet, Rfl: 11    Review of Systems   Constitutional:  Negative for fatigue, fever and unexpected weight change.   HENT:  Negative for ear pain, sinus pressure and sore throat.    Eyes:  Negative for pain.   Respiratory:  Negative for cough and shortness of breath.    Cardiovascular:  Negative for chest pain and leg swelling.   Gastrointestinal:  Negative for abdominal pain, constipation, nausea and vomiting.   Genitourinary:  Negative for dysuria, frequency and urgency.   Musculoskeletal:  Negative for arthralgias.   Skin:  Negative for rash.   Neurological:  Negative for dizziness, weakness and headaches.   Psychiatric/Behavioral:  Negative for sleep disturbance.           Objective:      Vitals:    10/03/23 1103 10/03/23 1113   BP: (!) 140/88 122/78   Pulse: 63    SpO2: 95%    Weight: 90.1 kg (198 lb 9.6 oz)    Height: 5' 10.25" (1.784 m)      Physical Exam  Vitals and nursing note reviewed.   Constitutional:       General: He is not in acute distress.     Appearance: Normal appearance. He is well-developed.   HENT:      Head: " Normocephalic and atraumatic.      Right Ear: External ear normal.      Left Ear: External ear normal.   Eyes:      Pupils: Pupils are equal, round, and reactive to light.   Neck:      Trachea: No tracheal deviation.   Cardiovascular:      Rate and Rhythm: Normal rate and regular rhythm.      Heart sounds: No murmur heard.     No friction rub. No gallop.   Pulmonary:      Breath sounds: Normal breath sounds. No stridor. No wheezing or rales.   Abdominal:      Palpations: Abdomen is soft. There is no mass.      Tenderness: There is no abdominal tenderness.   Musculoskeletal:         General: No tenderness or deformity.      Cervical back: Neck supple.   Lymphadenopathy:      Cervical: No cervical adenopathy.   Skin:     General: Skin is warm and dry.   Neurological:      Mental Status: He is alert and oriented to person, place, and time.      Coordination: Coordination normal.   Psychiatric:         Thought Content: Thought content normal.           Assessment:       1. Hyperlipidemia, unspecified hyperlipidemia type    2. High risk medication use    3. Migraine without status migrainosus, not intractable, unspecified migraine type    4. Elevated blood sugar    5. Depression, unspecified depression type    6. Anxiety    7. Polyp of colon, unspecified part of colon, unspecified type         Plan:       Hyperlipidemia, unspecified hyperlipidemia type    High risk medication use    Migraine without status migrainosus, not intractable, unspecified migraine type    Elevated blood sugar  Comments:  hga1c is 5.1  Orders:  -     POCT HEMOGLOBIN A1C    Depression, unspecified depression type  Comments:  add well butrin    Anxiety  Comments:  xanax    Polyp of colon, unspecified part of colon, unspecified type  Comments:  needs to see gi    Other orders  -     buPROPion (WELLBUTRIN XL) 150 MG TB24 tablet; Take 1 tablet (150 mg total) by mouth once daily.  Dispense: 30 tablet; Refill: 11  -     paroxetine (PAXIL-CR) 12.5 MG 24  hr tablet; Take 1 tablet (12.5 mg total) by mouth every morning.  Dispense: 30 tablet; Refill: 11      Follow up in about 6 months (around 4/3/2024), or if symptoms worsen or fail to improve, for medication management.        10/30/2023 Lisa Randle

## 2023-10-31 NOTE — LETTER
AUTHORIZATION FOR RELEASE OF   CONFIDENTIAL INFORMATION    Dear Dr. Cade,    We are seeing Babatunde Parson, date of birth 1955, in the clinic at [address]. Lisa Randle NP is the patient's PCP. Babatunde Parson has an outstanding lab/procedure at the time we reviewed his chart. In order to help keep his health information updated, he has authorized us to request the following medical record(s):                                             ( X )  COLONOSCOPY ( Most Recent After  )       Please fax records to [facility], Powell, Jodi, NP, [phone]     If you have any questions, please contact     Roosevelt PULLIAM  Clinical Care Coordinator    [facility]  [phone] (Phone)  [phone] (Fax)      Patient Name: Babatunde Parson  : 1955  Patient Phone #: [phone]           CONSENT AND ACKNOWLEDGEMENT         FORM       Babatunde Parson  MRN: [id number]  : 1955  Age: 67 y.o.  Sex: male       MEDICARE-PATIENTS CERTIFICATION, AUTHORIZATION TO RELEASE INFORMATION AND PAYMENT REQUEST:  I certify that the information given by me in applying under the Title XVII of Social Security Act is correct.  I authorize any diaz of medical or other information about me to release to the Social Security Administration or its intermediaries or carriers any information needed for this or a related Medicare claim.  I request that payment of authorized benefits be made on my behalf to [facility] and [facility] ([facility]).  I also acknowledge upon admission, that I received the Important Message from Medicare.     AUTHORIZATION TO PAY INSURANCE BENEFITS:  For and in consideration of medical services rendered to the patient named herein, I hereby assign and transfer to [facility], including but not limited to hospital based physicians, attending physicians, consulting physicians, nurse practitioners  and physicians assistants the rights for the payment of medical benefits which I may have under the policy/policies identified by me during registration or any policy which may be determined hereafter to pay benefits otherwise payable to me or to a beneficiary designated in the policy.  By this assignment, I authorize payment directly to [facility], hospital based physicians, attending physicians and consulting physicians of all medical benefits payable under the aforesaid policy/policies, but not to exceed the hospitals and/or clinic regular charges.     GUARANTEE OF ACCOUNT:  I/We certify that the information given is true and correct to the best of my/our knowledge.  I/We understand that bills are payable within thirty (30) days of the date of service.  If it becomes necessary for the account to be referred to an  or collection agency, the undersigned agrees to pay the reasonable s fees or collection expenses. I/We katy permission and consent to [facility], our assignees, and third party collection agents to contact myself/us by any telephone number associated with myself/us, including wireless numbers and to leave answering machine and voicemail messages and include in any such messages, information required by law (including debt collection laws) and/or messages regarding amounts owed; to send text messages or emails using any email addresses I/we provided; to use pre-recorded/artificial voice messages  and/or an automatic dialing device in connection with any communications.   I/We agree to be responsible for the payment of all charges of this medical service and hospital based physicians, attending physicians and consulting physicians services rendered to the above named patient     COMMUNICATION AUTHORIZATION:   I hereby authorize Fanny Saeed, to contact me on my cell phone and/or home phone using prerecorded messages, artificial voice messages, automatic telephone  dialing devices or other computer assisted technology, or by electronic mail, text messaging, or by any other form of electronic communication. This includes, but is not limited to, appointment reminders, yearly physical exam reminders, preventive care reminders, patient campaigns and welcome calls. I understand I have the right to opt out of these communications at any time.        Page 1 of 3                 CONSENT AND ACKNOWLEDGEMENT FORM CONTINUED     AUTHORIZATION TO RELEASE INFORMATION:  I hereby authorize [facility] and hospital based physicians to release the information for this occasion of service requested by my insurance company or third party payor for the purpose of obtaining payment for services rendered during this admission and/or to other healthcare providers for the purpose of follow-up care or evaluation of care.  This information may or may not include mental health and/or substance abuse information.     AUTHORIZATION FOR MEDICAL AND/OR SURGICAL TREATMENT:  I hereby authorize [facility] and its employees or agents to provide hospital care incident to this admission, including without limitations, consent to routine diagnostic procedures and medical treatment, which is to include whatever procedures that are deemed necessary by the admitting doctor and such other physicians or assistants as he may designate.     PERSONAL VALUABLES:      It is understood and agreed that the hospital maintains a safe for the safekeeping of money and valuables and the hospital shall not be liable for the loss of damage to any money, jewelry, glasses, documents, dentures, hearing aids or other articles of unusual value, unless placed therein, and shall not be liable for loss or damage to any other personal property, unless deposited with the hospital for safekeeping.  VALUABLES ARE NOT TO BE LEFT IN THE PATIENTS ROOM.     ADVANCE DIRECTIVES:  I understand that I am not required to have Advance  Directives in order to be treated.  I have received written information about my rights to formulate Advance Directives.     NOTICE OF PRIVACY PRACTICES/PATIENT RIGHTS/ADMISSION PACKET:  I acknowledge that I have received copies of the [facility] Notice of Privacy Practices, Patient Rights, and the Admission packet, which contains Smoking Cessation information. I understand that weapons, illegal drugs, or any other items considered  contraband, are not allowed on the [facility] campus, and that I do not have such items in my possession.        CONSENT TO PHOTOGRAPH AND/OR VIDEO TAPE DOCUMENTATION OF CARE:  I understand that photographs, videotapes, digital, or other images may be recorded to document my care.  I acknowledge that [facility] will retain the ownership rights to these photographs, videotapes, digital, or other images, and that I will be allowed access to view or obtain copies of any photographs, videotapes, digital, or other images created as part of the documentation of my care.  I understand that these images will be stored in a secure manner that will protect my privacy and that they will be kept for the time period required by law or by policy at [facility]. Images that identify me will be released and/or used outside the institution only upon written authorization from me or my legal representative (DANA, 2001).                                                                                                                                Page 2 of 3                    CONSENT AND ACKNOWLEDGEMENT FORM CONTINUED     [place] IMM[employer]ATION NETWORK (LINKS) PARTICIPATION:  I acknowledge that I have been informed about [place] [employer] Network, or LINKS.  I understand that it is a means to keep track of my immunization records for myself, doctors offices, hospitals and other health care providers through secure, electronic means.     INSURANCE NETWORK ACKNOWLEDGEMENT:                                                                             I acknowledge that I have received notice, based on the information available at this time, regarding the status of my insurance plan as in or out of network at [facility]. I understand that a full listing of accepted insurance plans can be found at the [facility] website.     NOTICE     HEALTH CARE SERVICES MAY BE PROVIDED TO YOU AT A NETWORK HEALTH CARE FACILITY BY FACILITY-BASED PHYSICIANS WHO ARE NOT IN YOUR HEALTH PLAN.  YOU MAY BE RESPONSIBLE FOR PAYMENT OF ALL OR PART OF THE FEES FOR THOSE OUT-OF-NETWORK SERVICES, IN ADDITION TO APPLICABLE AMOUNTS DUE FOR CO-PAYMENTS, COINSURANCE, DEDUCTIBLES, AND NON-COVERED SERVICES.  SPECIFIC INFORMATION ABOUT IN-NETWORK AND OUT-OF NETWORK FACILITY-BASED PHYSICIANS CAN BE FOUND AT THE WEBSITE ADDRESS OF YOUR HEALTH PLAN OR BY CALLING THE [employer] TELEPHONE NUMBER OF YOUR HEALTH PLAN.        I/WE HAVE READ, UNDERSTAND AND AGREE TO THE ABOVE.        _______________________________   Patient/Legal Guardian Signature     This signature was collected at 03/27/2023     Time (if no electronic signature):____________     Babatunde Parson     Self  _______________________________   Printed Name/Relationship to Patient       Witness  Sign Here  _______________________________   Witness Signature     This signature was collected at 03/27/2023        _______________________________   Printed Name         Page 3 of 3

## 2023-10-31 NOTE — PROGRESS NOTES

## 2023-11-06 ENCOUNTER — PATIENT MESSAGE (OUTPATIENT)
Dept: FAMILY MEDICINE | Facility: CLINIC | Age: 68
End: 2023-11-06

## 2023-11-07 ENCOUNTER — TELEPHONE (OUTPATIENT)
Dept: FAMILY MEDICINE | Facility: CLINIC | Age: 68
End: 2023-11-07

## 2023-11-07 RX ORDER — ACETAMINOPHEN AND CODEINE PHOSPHATE 300; 30 MG/1; MG/1
1 TABLET ORAL EVERY 8 HOURS PRN
Qty: 20 TABLET | Refills: 0 | Status: SHIPPED | OUTPATIENT
Start: 2023-11-07 | End: 2023-11-17

## 2023-11-07 NOTE — TELEPHONE ENCOUNTER
----- Message from May Huber sent at 11/7/2023  3:27 PM CST -----  Nicol called from a Home Sleep Study company. The patient is going to have a  Home Sleep Study . Please fax his last visit notes with the Drs  signature on it.Nicol's  # [phone] FAX # [phone] OC

## 2023-12-04 ENCOUNTER — TELEPHONE (OUTPATIENT)
Dept: FAMILY MEDICINE | Facility: CLINIC | Age: 68
End: 2023-12-04

## 2023-12-04 NOTE — TELEPHONE ENCOUNTER
----- Message from May Huber sent at 12/4/2023 11:10 AM CST -----  Cassy from [facility] called. They received a Home Sleep Order and the OV from 10/03/2023. They need a addendum added to that note saying the  Ordered a Home Sleep Test a diagnosis and his sleep issues. Phone # [phone] ext [phone]  FAX # [phone] GH

## 2024-03-20 DIAGNOSIS — E78.5 HYPERLIPIDEMIA, UNSPECIFIED HYPERLIPIDEMIA TYPE: ICD-10-CM

## 2024-03-20 RX ORDER — ATORVASTATIN CALCIUM 20 MG/1
20 TABLET, FILM COATED ORAL DAILY
Qty: 30 TABLET | Refills: 1 | Status: SHIPPED | OUTPATIENT
Start: 2024-03-20

## 2024-04-16 ENCOUNTER — PATIENT MESSAGE (OUTPATIENT)
Dept: FAMILY MEDICINE | Facility: CLINIC | Age: 69
End: 2024-04-16
Payer: MEDICARE

## 2024-04-16 DIAGNOSIS — Z79.899 ENCOUNTER FOR LONG-TERM (CURRENT) USE OF OTHER MEDICATIONS: Primary | ICD-10-CM

## 2024-04-16 DIAGNOSIS — R73.9 ELEVATED BLOOD SUGAR: ICD-10-CM

## 2024-04-16 DIAGNOSIS — E78.5 HYPERLIPIDEMIA, UNSPECIFIED HYPERLIPIDEMIA TYPE: ICD-10-CM

## 2024-04-17 ENCOUNTER — PATIENT MESSAGE (OUTPATIENT)
Dept: FAMILY MEDICINE | Facility: CLINIC | Age: 69
End: 2024-04-17
Payer: MEDICARE

## 2024-04-20 LAB
ALBUMIN SERPL-MCNC: 4.5 G/DL (ref 3.6–5.1)
ALBUMIN/CREAT UR: 5 MG/G CREAT
ALBUMIN/GLOB SERPL: 2 (CALC) (ref 1–2.5)
ALP SERPL-CCNC: 70 U/L (ref 35–144)
ALT SERPL-CCNC: 22 U/L (ref 9–46)
APPEARANCE UR: CLEAR
AST SERPL-CCNC: 20 U/L (ref 10–35)
BACTERIA #/AREA URNS HPF: ABNORMAL /HPF
BACTERIA UR CULT: ABNORMAL
BACTERIA UR CULT: ABNORMAL
BASOPHILS # BLD AUTO: 20 CELLS/UL (ref 0–200)
BASOPHILS NFR BLD AUTO: 0.5 %
BILIRUB SERPL-MCNC: 1.2 MG/DL (ref 0.2–1.2)
BILIRUB UR QL STRIP: NEGATIVE
BUN SERPL-MCNC: 13 MG/DL (ref 7–25)
BUN/CREAT SERPL: ABNORMAL (CALC) (ref 6–22)
CALCIUM SERPL-MCNC: 9.9 MG/DL (ref 8.6–10.3)
CHLORIDE SERPL-SCNC: 104 MMOL/L (ref 98–110)
CHOLEST SERPL-MCNC: 180 MG/DL
CHOLEST/HDLC SERPL: 3.3 (CALC)
CO2 SERPL-SCNC: 28 MMOL/L (ref 20–32)
COLOR UR: YELLOW
CREAT SERPL-MCNC: 0.8 MG/DL (ref 0.7–1.35)
CREAT UR-MCNC: 151 MG/DL (ref 20–320)
EGFR: 96 ML/MIN/1.73M2
EOSINOPHIL # BLD AUTO: 72 CELLS/UL (ref 15–500)
EOSINOPHIL NFR BLD AUTO: 1.8 %
ERYTHROCYTE [DISTWIDTH] IN BLOOD BY AUTOMATED COUNT: 11.8 % (ref 11–15)
GLOBULIN SER CALC-MCNC: 2.3 G/DL (CALC) (ref 1.9–3.7)
GLUCOSE SERPL-MCNC: 102 MG/DL (ref 65–99)
GLUCOSE UR QL STRIP: NEGATIVE
HCT VFR BLD AUTO: 44.2 % (ref 38.5–50)
HDLC SERPL-MCNC: 54 MG/DL
HGB BLD-MCNC: 14.5 G/DL (ref 13.2–17.1)
HGB UR QL STRIP: NEGATIVE
HYALINE CASTS #/AREA URNS LPF: ABNORMAL /LPF
KETONES UR QL STRIP: NEGATIVE
LDLC SERPL CALC-MCNC: 99 MG/DL (CALC)
LEUKOCYTE ESTERASE UR QL STRIP: ABNORMAL
LYMPHOCYTES # BLD AUTO: 1404 CELLS/UL (ref 850–3900)
LYMPHOCYTES NFR BLD AUTO: 35.1 %
MCH RBC QN AUTO: 30.6 PG (ref 27–33)
MCHC RBC AUTO-ENTMCNC: 32.8 G/DL (ref 32–36)
MCV RBC AUTO: 93.2 FL (ref 80–100)
MICROALBUMIN UR-MCNC: 0.7 MG/DL
MONOCYTES # BLD AUTO: 424 CELLS/UL (ref 200–950)
MONOCYTES NFR BLD AUTO: 10.6 %
NEUTROPHILS # BLD AUTO: 2080 CELLS/UL (ref 1500–7800)
NEUTROPHILS NFR BLD AUTO: 52 %
NITRITE UR QL STRIP: NEGATIVE
NONHDLC SERPL-MCNC: 126 MG/DL (CALC)
PH UR STRIP: 6.5 [PH] (ref 5–8)
PLATELET # BLD AUTO: 202 THOUSAND/UL (ref 140–400)
PMV BLD REES-ECKER: 10.7 FL (ref 7.5–12.5)
POTASSIUM SERPL-SCNC: 4.3 MMOL/L (ref 3.5–5.3)
PROT SERPL-MCNC: 6.8 G/DL (ref 6.1–8.1)
PROT UR QL STRIP: NEGATIVE
RBC # BLD AUTO: 4.74 MILLION/UL (ref 4.2–5.8)
RBC #/AREA URNS HPF: ABNORMAL /HPF
SERVICE CMNT-IMP: ABNORMAL
SODIUM SERPL-SCNC: 140 MMOL/L (ref 135–146)
SP GR UR STRIP: 1.02 (ref 1–1.03)
SQUAMOUS #/AREA URNS HPF: ABNORMAL /HPF
TRIGL SERPL-MCNC: 176 MG/DL
TSH SERPL-ACNC: 0.67 MIU/L (ref 0.4–4.5)
WBC # BLD AUTO: 4 THOUSAND/UL (ref 3.8–10.8)
WBC #/AREA URNS HPF: ABNORMAL /HPF

## 2024-08-07 DIAGNOSIS — E78.5 HYPERLIPIDEMIA, UNSPECIFIED HYPERLIPIDEMIA TYPE: ICD-10-CM

## 2024-08-07 RX ORDER — ATORVASTATIN CALCIUM 20 MG/1
20 TABLET, FILM COATED ORAL DAILY
Qty: 30 TABLET | Refills: 1 | Status: CANCELLED | OUTPATIENT
Start: 2024-08-07

## 2024-10-23 ENCOUNTER — OFFICE VISIT (OUTPATIENT)
Dept: FAMILY MEDICINE | Facility: CLINIC | Age: 69
End: 2024-10-23
Payer: MEDICARE

## 2024-10-23 VITALS
SYSTOLIC BLOOD PRESSURE: 136 MMHG | DIASTOLIC BLOOD PRESSURE: 70 MMHG | HEIGHT: 70 IN | HEART RATE: 55 BPM | WEIGHT: 198.19 LBS | BODY MASS INDEX: 28.37 KG/M2 | OXYGEN SATURATION: 96 %

## 2024-10-23 DIAGNOSIS — F41.9 ANXIETY: ICD-10-CM

## 2024-10-23 DIAGNOSIS — N52.9 ERECTILE DYSFUNCTION, UNSPECIFIED ERECTILE DYSFUNCTION TYPE: ICD-10-CM

## 2024-10-23 DIAGNOSIS — E78.5 HYPERLIPIDEMIA, UNSPECIFIED HYPERLIPIDEMIA TYPE: ICD-10-CM

## 2024-10-23 DIAGNOSIS — F32.A DEPRESSION, UNSPECIFIED DEPRESSION TYPE: ICD-10-CM

## 2024-10-23 DIAGNOSIS — Z79.899 HIGH RISK MEDICATION USE: ICD-10-CM

## 2024-10-23 DIAGNOSIS — F10.10 ALCOHOL ABUSE: Primary | ICD-10-CM

## 2024-10-23 DIAGNOSIS — Z12.5 SCREENING FOR PROSTATE CANCER: ICD-10-CM

## 2024-10-23 DIAGNOSIS — G43.909 MIGRAINE WITHOUT STATUS MIGRAINOSUS, NOT INTRACTABLE, UNSPECIFIED MIGRAINE TYPE: ICD-10-CM

## 2024-10-23 PROCEDURE — 99214 OFFICE O/P EST MOD 30 MIN: CPT | Mod: S$GLB,,, | Performed by: NURSE PRACTITIONER

## 2024-10-23 RX ORDER — ACETAMINOPHEN AND CODEINE PHOSPHATE 300; 30 MG/1; MG/1
1 TABLET ORAL EVERY 8 HOURS PRN
Qty: 30 TABLET | Refills: 0 | Status: SHIPPED | OUTPATIENT
Start: 2024-10-23

## 2024-10-23 RX ORDER — ACETAMINOPHEN AND CODEINE PHOSPHATE 300; 30 MG/1; MG/1
TABLET ORAL EVERY 8 HOURS PRN
COMMUNITY
End: 2024-10-23 | Stop reason: SDUPTHER

## 2024-10-23 RX ORDER — MULTIVITAMIN
1 TABLET ORAL DAILY
COMMUNITY

## 2024-10-23 RX ORDER — BUTALBITAL, ACETAMINOPHEN AND CAFFEINE 50; 325; 40 MG/1; MG/1; MG/1
1 TABLET ORAL EVERY 6 HOURS PRN
Qty: 30 TABLET | Refills: 5 | Status: SHIPPED | OUTPATIENT
Start: 2024-10-23

## 2024-10-23 RX ORDER — ALPRAZOLAM 0.5 MG/1
0.5 TABLET ORAL DAILY PRN
Qty: 20 TABLET | Refills: 1 | Status: SHIPPED | OUTPATIENT
Start: 2024-10-23 | End: 2024-11-22

## 2024-10-23 RX ORDER — ATORVASTATIN CALCIUM 20 MG/1
20 TABLET, FILM COATED ORAL DAILY
Qty: 30 TABLET | Refills: 1 | Status: SHIPPED | OUTPATIENT
Start: 2024-10-23

## 2024-10-23 NOTE — LETTER
AUTHORIZATION FOR RELEASE OF   CONFIDENTIAL INFORMATION    Dear Dr Cade,    We are seeing Babatunde Parson, date of birth 1955, in the clinic at [facility]. Lisa Randle NP is the patient's PCP. Babatunde Parson has an outstanding lab/procedure at the time we reviewed his chart. In order to help keep his health information updated, he has authorized us to request the following medical record(s):        (  )  MAMMOGRAM                                      ( X )  COLONOSCOPY      (  )  PAP SMEAR                                          (  )  OUTSIDE LAB RESULTS     (  )  DEXA SCAN                                          (  )  EYE EXAM            (  )  FOOT EXAM                                          (  )  ENTIRE RECORD     (  )  OUTSIDE IMMUNIZATIONS                 (  )  _______________         Please fax records to [facility], Powell, Jodi, NP, [phone]     If you have any questions, please contact [place] at [phone].           Patient Name: Babatunde Parson  : 1955  Patient Phone #: [phone]

## 2024-10-23 NOTE — PROGRESS NOTES
SUBJECTIVE:    Patient ID: Babatunde Parson is a 69 y.o. male.    Chief Complaint: Annual Exam (Bottles brought//Pt is here for an annual exam//Colonoscopy done with Dr Cade-will request records//Declined flu vaccine//KE)    History of Present Illness    CHIEF COMPLAINT:  Babatunde presents today for check up. Treated for hyperlipidemia, migraines, anxiety, depression .     ALCOHOL USE DISORDER:  He reports increased alcohol consumption, drinking 2-3 beers and 6 additional drinks daily. He expresses interest in seeking inpatient rehabilitation but states that various obligations have prevented him from following through.    MENTAL HEALTH:  He reports ongoing depression and acknowledges that increased alcohol consumption may be related to his depressive symptoms. He denies current participation in grief counseling or other mental health interventions.    CARDIOVASCULAR:  He reports experiencing occasional heart flutters lasting only a second. He denies any symptoms during physical activity, including when cutting grass, working outside, or walking.    NEUROLOGICAL:  He reports occasional dizziness. He has a history of vertigo with a previous severe episode, but denies experiencing constant dizziness or current vertigo symptoms.    FAMILY HISTORY:  He reports a family history of lung issues. His mother was initially thought to have heart problems but was ultimately diagnosed with lung disease prior to her death.    MEDICATIONS:  His current medications include Tylenol with codeine (taken infrequently), an unspecified medication for headaches, a cholesterol medication, and Xanax as needed.    LIFESTYLE:  He maintains an active lifestyle, regularly cutting grass, working outside, and engaging in some walking. He denies experiencing any symptoms or discomfort during these physical activities. He quit smoking 4 years ago.      ROS:  General: -fever, -chills, -fatigue, -weight gain, -weight loss  Eyes: -vision changes,  -redness, -discharge  ENT: -ear pain, -nasal congestion, -sore throat  Cardiovascular: -chest pain, +palpitations, -lower extremity edema  Respiratory: -cough, -shortness of breath  Gastrointestinal: -abdominal pain, -nausea, -vomiting, -diarrhea, -constipation, -blood in stool  Genitourinary: -dysuria, -hematuria, -frequency  Musculoskeletal: -joint pain, -muscle pain  Skin: -rash, -lesion  Neurological: -headache, +dizziness, -numbness, -tingling  Psychiatric: -anxiety, +depression, -sleep difficulty         Office Visit on 10/23/2024   Component Date Value Ref Range Status    Glucose 10/23/2024 106 (H)  65 - 99 mg/dL Final    BUN 10/23/2024 17  7 - 25 mg/dL Final    Creatinine 10/23/2024 0.85  0.70 - 1.28 mg/dL Final    eGFR 10/23/2024 93  > OR = 60 mL/min/1.73m2 Final    BUN/Creatinine Ratio 10/23/2024 SEE NOTE:  6 - 22 (calc) Final    Sodium 10/23/2024 143  135 - 146 mmol/L Final    Potassium 10/23/2024 4.1  3.5 - 5.3 mmol/L Final    Chloride 10/23/2024 105  98 - 110 mmol/L Final    CO2 10/23/2024 28  20 - 32 mmol/L Final    Calcium 10/23/2024 9.3  8.6 - 10.3 mg/dL Final    Total Protein 10/23/2024 6.5  6.1 - 8.1 g/dL Final    Albumin 10/23/2024 4.3  3.6 - 5.1 g/dL Final    Globulin, Total 10/23/2024 2.2  1.9 - 3.7 g/dL (calc) Final    Albumin/Globulin Ratio 10/23/2024 2.0  1.0 - 2.5 (calc) Final    Total Bilirubin 10/23/2024 1.1  0.2 - 1.2 mg/dL Final    Alkaline Phosphatase 10/23/2024 83  35 - 144 U/L Final    AST 10/23/2024 16  10 - 35 U/L Final    ALT 10/23/2024 17  9 - 46 U/L Final    Cholesterol 10/23/2024 215 (H)  <200 mg/dL Final    HDL 10/23/2024 59  > OR = 40 mg/dL Final    Triglycerides 10/23/2024 169 (H)  <150 mg/dL Final    LDL Cholesterol 10/23/2024 127 (H)  mg/dL (calc) Final    HDL/Cholesterol Ratio 10/23/2024 3.6  <5.0 (calc) Final    Non HDL Chol. (LDL+VLDL) 10/23/2024 156 (H)  <130 mg/dL (calc) Final    PROSTATE SPECIFIC ANTIGEN, SCR - Q* 10/23/2024 2.13  < OR = 4.00 ng/mL Final    WBC  10/23/2024 4.2  3.8 - 10.8 Thousand/uL Final    RBC 10/23/2024 4.81  4.20 - 5.80 Million/uL Final    Hemoglobin 10/23/2024 15.0  13.2 - 17.1 g/dL Final    Hematocrit 10/23/2024 45.8  38.5 - 50.0 % Final    MCV 10/23/2024 95.2  80.0 - 100.0 fL Final    MCH 10/23/2024 31.2  27.0 - 33.0 pg Final    MCHC 10/23/2024 32.8  32.0 - 36.0 g/dL Final    RDW 10/23/2024 12.0  11.0 - 15.0 % Final    Platelets 10/23/2024 174  140 - 400 Thousand/uL Final    MPV 10/23/2024 10.5  7.5 - 12.5 fL Final    Neutrophils, Abs 10/23/2024 2,201  1,500 - 7,800 cells/uL Final    Lymph # 10/23/2024 1,445  850 - 3,900 cells/uL Final    Mono # 10/23/2024 491  200 - 950 cells/uL Final    Eos # 10/23/2024 42  15 - 500 cells/uL Final    Baso # 10/23/2024 21  0 - 200 cells/uL Final    Neutrophils Relative 10/23/2024 52.4  % Final    Lymph % 10/23/2024 34.4  % Final    Mono % 10/23/2024 11.7  % Final    Eosinophil % 10/23/2024 1.0  % Final    Basophil % 10/23/2024 0.5  % Final       Past Medical History:   Diagnosis Date    Basal cell carcinoma     Hyperlipidemia     Squamous cell carcinoma of skin      Social History     Socioeconomic History    Marital status: Other   Tobacco Use    Smoking status: Former    Smokeless tobacco: Never    Tobacco comments:     quit 1990   Substance and Sexual Activity    Alcohol use: Yes     Comment: 3 drinks daily  beer or liquor    Drug use: Never   Social History Narrative    ** Merged History Encounter **          Social Drivers of Health     Financial Resource Strain: Low Risk  (10/22/2024)    Overall Financial Resource Strain (CARDIA)     Difficulty of Paying Living Expenses: Not hard at all   Food Insecurity: No Food Insecurity (10/22/2024)    Hunger Vital Sign     Worried About Running Out of Food in the Last Year: Never true     Ran Out of Food in the Last Year: Never true   Physical Activity: Insufficiently Active (10/22/2024)    Exercise Vital Sign     Days of Exercise per Week: 4 days     Minutes of Exercise  per Session: 30 min   Stress: Stress Concern Present (10/22/2024)    [facility] Occupational Health - Occupational Stress Questionnaire     Feeling of Stress : To some extent   Housing Stability: Unknown (10/22/2024)    Housing Stability Vital Sign     Unable to Pay for Housing in the Last Year: No     Past Surgical History:   Procedure Laterality Date    COLONOSCOPY N/A 5/26/2020    Procedure: COLONOSCOPY;  Surgeon: JEN Cade MD;  Location: [address];  Service: Endoscopy;  Laterality: N/A;  COVID screening on 5/25/20 - [facility]  prep ins. emailed - [facility]     No family history on file.    Tests to Keep You Healthy    Colon Cancer Screening: ORDERED      Review of patient's allergies indicates:  No Known Allergies    Current Outpatient Medications:     multivitamin (THERAGRAN) per tablet, Take 1 tablet by mouth once daily., Disp: , Rfl:     paroxetine (PAXIL-CR) 12.5 MG 24 hr tablet, Take 1 tablet (12.5 mg total) by mouth every morning., Disp: 30 tablet, Rfl: 11    acetaminophen-codeine 300-30mg (TYLENOL #3) 300-30 mg Tab, Take 1 tablet by mouth every 8 (eight) hours as needed., Disp: 30 tablet, Rfl: 0    ALPRAZolam (XANAX) 0.5 MG tablet, Take 1 tablet (0.5 mg total) by mouth daily as needed for Anxiety., Disp: 20 tablet, Rfl: 1    atorvastatin (LIPITOR) 20 MG tablet, Take 1 tablet (20 mg total) by mouth once daily., Disp: 30 tablet, Rfl: 1    buPROPion (WELLBUTRIN XL) 150 MG TB24 tablet, Take 1 tablet (150 mg total) by mouth once daily., Disp: 30 tablet, Rfl: 11    butalbital-acetaminophen-caffeine -40 mg (FIORICET, ESGIC) -40 mg per tablet, Take 1 tablet by mouth every 6 (six) hours as needed for Pain., Disp: 30 tablet, Rfl: 5    tadalafiL (CIALIS) 10 MG tablet, Take 1 tablet (10 mg total) by mouth daily as needed for Erectile Dysfunction., Disp: 30 tablet, Rfl: 1    Objective:      Vitals:    10/23/24 1049   BP: 136/70   Pulse: (!) 55   SpO2: 96%   Weight: 89.9 kg (198 lb 3.2 oz)  "  Height: 5' 10" (1.778 m)     Physical Exam    General: No acute distress. Well-developed. Well-nourished.  Eyes: EOMI. Sclerae anicteric.  HENT: Normocephalic. Atraumatic. Nares patent. Moist oral mucosa.  Ears: Bilateral TMs clear. Bilateral EACs clear.  Cardiovascular: Regular rate. Regular rhythm. No murmurs. No rubs. No gallops. Normal S1, S2.  Respiratory: Normal respiratory effort. Clear to auscultation bilaterally. No rales. No rhonchi. No wheezing.  Abdomen: Soft. Non-tender. Non-distended. Normoactive bowel sounds.  Musculoskeletal: No  obvious deformity.  Extremities: No lower extremity edema.  Neurological: Alert & oriented x3. No slurred speech. Normal gait.  Psychiatric: Normal mood. Normal affect. Good insight. Good judgment.  Skin: Warm. Dry. No rash.       Assessment:       Assessment & Plan    - Assessed patient's alcohol consumption and readiness for inpatient rehab  - Evaluated reported palpitations, considering potential causes including alcohol withdrawal effects  - Will order cardiac monitoring to investigate intermittent palpitations and rule out arrhythmias  - Reviewed PSA history, noting stable levels over past 2 years  - Considered reported dizziness and vertigo in relation to cardiac symptoms  - Determined need for liver function tests due to cholesterol medication and alcohol use    PROSTATE HEALTH:  - Explained importance of PSA trend analysis in prostate health monitoring.  - PSA test ordered.    CARDIAC MONITORING:  - Discussed non-invasive nature of cardiac monitoring and its purpose in detecting arrhythmias.  - 7-day cardiac monitoring ordered.  - Referral to cardiologist planned for interpretation of cardiac monitor results.  - Follow up after cardiac monitoring is completed for cardiologist referral.  - Contact the office to answer phone calls regarding cardiac monitor setup.    ALCOHOL REHABILITATION:  - Babatunde to follow through with plans for inpatient alcohol " rehabilitation.    HEADACHES:  - Refilled Tylenol with codeine for headaches.    HYPERLIPIDEMIA:  - Continued cholesterol medication.    ANXIETY:  - Refilled Xanax as needed.    LABS:  - Complete blood count (CBC) ordered.  - Liver function tests ordered.       Plan:       Alcohol abuse  Comments:  will consider rehab    Hyperlipidemia, unspecified hyperlipidemia type  Comments:  lipitor 20  Orders:  -     atorvastatin (LIPITOR) 20 MG tablet; Take 1 tablet (20 mg total) by mouth once daily.  Dispense: 30 tablet; Refill: 1  -     Comprehensive Metabolic Panel; Future; Expected date: 10/23/2024  -     Lipid Panel; Future; Expected date: 10/23/2024    High risk medication use  -     CBC Auto Differential; Future; Expected date: 10/23/2024    Depression, unspecified depression type  Comments:  wellbutrin    Screening for prostate cancer  -     PSA, Screening; Future; Expected date: 10/23/2024    Anxiety  Comments:  paxil, xanax prn    Migraine without status migrainosus, not intractable, unspecified migraine type  Comments:  fioricet    Erectile dysfunction, unspecified erectile dysfunction type  Comments:  cialis prn    Other orders  -     acetaminophen-codeine 300-30mg (TYLENOL #3) 300-30 mg Tab; Take 1 tablet by mouth every 8 (eight) hours as needed.  Dispense: 30 tablet; Refill: 0  -     butalbital-acetaminophen-caffeine -40 mg (FIORICET, ESGIC) -40 mg per tablet; Take 1 tablet by mouth every 6 (six) hours as needed for Pain.  Dispense: 30 tablet; Refill: 5  -     ALPRAZolam (XANAX) 0.5 MG tablet; Take 1 tablet (0.5 mg total) by mouth daily as needed for Anxiety.  Dispense: 20 tablet; Refill: 1      Follow up in about 6 months (around 4/23/2025), or if symptoms worsen or fail to improve, for medication management.        This note was generated with the assistance of ambient listening technology. Verbal consent was obtained by the patient and accompanying visitor(s) for the recording of patient  appointment to facilitate this note. I attest to having reviewed and edited the generated note for accuracy, though some syntax or spelling errors may persist. Please contact the author of this note for any clarification.      10/25/2024 Lisa Randle      Answers submitted by the patient for this visit:  Review of Systems Questionnaire (Submitted on 10/22/2024)  activity change: No  unexpected weight change: No  neck pain: Yes  hearing loss: Yes  rhinorrhea: No  trouble swallowing: No  eye discharge: No  visual disturbance: Yes  chest tightness: No  wheezing: No  chest pain: No  palpitations: Yes  blood in stool: No  constipation: No  vomiting: No  diarrhea: No  polydipsia: Yes  polyuria: Yes  difficulty urinating: No  urgency: No  hematuria: No  joint swelling: No  arthralgias: Yes  headaches: Yes  weakness: Yes  confusion: Yes  dysphoric mood: Yes

## 2024-10-24 LAB
ALBUMIN SERPL-MCNC: 4.3 G/DL (ref 3.6–5.1)
ALBUMIN/GLOB SERPL: 2 (CALC) (ref 1–2.5)
ALP SERPL-CCNC: 83 U/L (ref 35–144)
ALT SERPL-CCNC: 17 U/L (ref 9–46)
AST SERPL-CCNC: 16 U/L (ref 10–35)
BASOPHILS # BLD AUTO: 21 CELLS/UL (ref 0–200)
BASOPHILS NFR BLD AUTO: 0.5 %
BILIRUB SERPL-MCNC: 1.1 MG/DL (ref 0.2–1.2)
BUN SERPL-MCNC: 17 MG/DL (ref 7–25)
BUN/CREAT SERPL: ABNORMAL (CALC) (ref 6–22)
CALCIUM SERPL-MCNC: 9.3 MG/DL (ref 8.6–10.3)
CHLORIDE SERPL-SCNC: 105 MMOL/L (ref 98–110)
CHOLEST SERPL-MCNC: 215 MG/DL
CHOLEST/HDLC SERPL: 3.6 (CALC)
CO2 SERPL-SCNC: 28 MMOL/L (ref 20–32)
CREAT SERPL-MCNC: 0.85 MG/DL (ref 0.7–1.28)
EGFR: 93 ML/MIN/1.73M2
EOSINOPHIL # BLD AUTO: 42 CELLS/UL (ref 15–500)
EOSINOPHIL NFR BLD AUTO: 1 %
ERYTHROCYTE [DISTWIDTH] IN BLOOD BY AUTOMATED COUNT: 12 % (ref 11–15)
GLOBULIN SER CALC-MCNC: 2.2 G/DL (CALC) (ref 1.9–3.7)
GLUCOSE SERPL-MCNC: 106 MG/DL (ref 65–99)
HCT VFR BLD AUTO: 45.8 % (ref 38.5–50)
HDLC SERPL-MCNC: 59 MG/DL
HGB BLD-MCNC: 15 G/DL (ref 13.2–17.1)
LDLC SERPL CALC-MCNC: 127 MG/DL (CALC)
LYMPHOCYTES # BLD AUTO: 1445 CELLS/UL (ref 850–3900)
LYMPHOCYTES NFR BLD AUTO: 34.4 %
MCH RBC QN AUTO: 31.2 PG (ref 27–33)
MCHC RBC AUTO-ENTMCNC: 32.8 G/DL (ref 32–36)
MCV RBC AUTO: 95.2 FL (ref 80–100)
MONOCYTES # BLD AUTO: 491 CELLS/UL (ref 200–950)
MONOCYTES NFR BLD AUTO: 11.7 %
NEUTROPHILS # BLD AUTO: 2201 CELLS/UL (ref 1500–7800)
NEUTROPHILS NFR BLD AUTO: 52.4 %
NONHDLC SERPL-MCNC: 156 MG/DL (CALC)
PLATELET # BLD AUTO: 174 THOUSAND/UL (ref 140–400)
PMV BLD REES-ECKER: 10.5 FL (ref 7.5–12.5)
POTASSIUM SERPL-SCNC: 4.1 MMOL/L (ref 3.5–5.3)
PROT SERPL-MCNC: 6.5 G/DL (ref 6.1–8.1)
PSA SERPL-MCNC: 2.13 NG/ML
RBC # BLD AUTO: 4.81 MILLION/UL (ref 4.2–5.8)
SODIUM SERPL-SCNC: 143 MMOL/L (ref 135–146)
TRIGL SERPL-MCNC: 169 MG/DL
WBC # BLD AUTO: 4.2 THOUSAND/UL (ref 3.8–10.8)

## 2024-10-28 ENCOUNTER — TELEPHONE (OUTPATIENT)
Dept: FAMILY MEDICINE | Facility: CLINIC | Age: 69
End: 2024-10-28
Payer: MEDICARE

## 2024-11-18 ENCOUNTER — TELEPHONE (OUTPATIENT)
Dept: FAMILY MEDICINE | Facility: CLINIC | Age: 69
End: 2024-11-18
Payer: MEDICARE

## 2024-11-18 NOTE — TELEPHONE ENCOUNTER
----- Message from Darryn Salomon sent at 10/23/2024 11:37 AM CDT -----  Colonoscopy requested from Dr Segun Cade

## 2024-11-18 NOTE — LETTER
AUTHORIZATION FOR RELEASE OF   CONFIDENTIAL INFORMATION    Dear Dr Cade,    We are seeing Babatunde Parson, date of birth 1955, in the clinic at [facility]. Lisa Randle NP is the patient's PCP. Babatunde Parson has an outstanding lab/procedure at the time we reviewed his chart. In order to help keep his health information updated, he has authorized us to request the following medical record(s): 2nd Attempt       (  )  MAMMOGRAM                                      ( X )  COLONOSCOPY      (  )  PAP SMEAR                                          (  )  OUTSIDE LAB RESULTS     (  )  DEXA SCAN                                          (  )  EYE EXAM            (  )  FOOT EXAM                                          (  )  ENTIRE RECORD     (  )  OUTSIDE IMMUNIZATIONS                 (  )  _______________         Please fax records to [facility], Powell, Jodi, NP, [phone]     If you have any questions, please contact [place] at [phone].           Patient Name: Babatunde Parson  : 1955  Patient Phone #: [phone]

## 2024-11-27 ENCOUNTER — TELEPHONE (OUTPATIENT)
Dept: FAMILY MEDICINE | Facility: CLINIC | Age: 69
End: 2024-11-27
Payer: MEDICARE

## 2024-11-27 NOTE — TELEPHONE ENCOUNTER
Called Dr Cade's office to check on status of Colonoscopy request. Kristi stated she has sent a message to Dr Cade's nurse stating we are asking for it and for it to be sent as soon as possible.

## 2024-11-29 ENCOUNTER — TELEPHONE (OUTPATIENT)
Dept: FAMILY MEDICINE | Facility: CLINIC | Age: 69
End: 2024-11-29
Payer: MEDICARE

## 2024-12-02 ENCOUNTER — TELEPHONE (OUTPATIENT)
Dept: FAMILY MEDICINE | Facility: CLINIC | Age: 69
End: 2024-12-02
Payer: MEDICARE

## 2024-12-02 NOTE — TELEPHONE ENCOUNTER
Called patient to inquire about Colonoscopy as Dr Cade sent the last one they had from 2020. Patient states he had one with Dr Kennedy in 2022. Patient stated he will bring it by the office one day. I went ahead and requested it from Dr Kennedy.

## 2024-12-02 NOTE — TELEPHONE ENCOUNTER
----- Message from Med Assistant Salomon sent at 12/2/2024 10:27 AM CST -----  Regarding: [place]  Call pt and let him know he needs updated [place] and ask if he needs another referral. [place] was due in 2023

## 2024-12-02 NOTE — LETTER
AUTHORIZATION FOR RELEASE OF   CONFIDENTIAL INFORMATION    Dear Dr Kennedy,    We are seeing Babatunde Parson, date of birth 1955, in the clinic at [facility]. Lisa Randle NP is the patient's PCP. Babatunde Parson has an outstanding lab/procedure at the time we reviewed his chart. In order to help keep his health information updated, he has authorized us to request the following medical record(s):        (  )  MAMMOGRAM                                      ( X )  COLONOSCOPY      (  )  PAP SMEAR                                          (  )  OUTSIDE LAB RESULTS     (  )  DEXA SCAN                                          (  )  EYE EXAM            (  )  FOOT EXAM                                          (  )  ENTIRE RECORD     (  )  OUTSIDE IMMUNIZATIONS                 (  )  _______________         Please fax records to [facility], Powell, Jodi, NP, [phone]     If you have any questions, please contact [place] at [phone].           Patient Name: Babatunde Parson  : 1955  Patient Phone #: [phone]

## 2024-12-03 ENCOUNTER — TELEPHONE (OUTPATIENT)
Dept: FAMILY MEDICINE | Facility: CLINIC | Age: 69
End: 2024-12-03
Payer: MEDICARE

## 2024-12-03 NOTE — TELEPHONE ENCOUNTER
----- Message from Frank sent at 12/3/2024  2:36 PM CST -----  Pt is here and wants to speak to the nurse. He said he talked to the nurse on yesterday.  [phone]

## 2024-12-17 ENCOUNTER — PATIENT OUTREACH (OUTPATIENT)
Dept: ADMINISTRATIVE | Facility: HOSPITAL | Age: 69
End: 2024-12-17
Payer: MEDICARE

## 2025-01-28 ENCOUNTER — TELEPHONE (OUTPATIENT)
Dept: FAMILY MEDICINE | Facility: CLINIC | Age: 70
End: 2025-01-28
Payer: MEDICARE

## 2025-01-28 DIAGNOSIS — E78.5 HYPERLIPIDEMIA, UNSPECIFIED HYPERLIPIDEMIA TYPE: ICD-10-CM

## 2025-01-28 DIAGNOSIS — R73.9 ELEVATED BLOOD SUGAR: ICD-10-CM

## 2025-01-28 DIAGNOSIS — Z79.899 HIGH RISK MEDICATION USE: Primary | ICD-10-CM

## 2025-01-28 DIAGNOSIS — F10.10 ALCOHOL ABUSE: ICD-10-CM

## 2025-01-28 NOTE — TELEPHONE ENCOUNTER
----- Message from Kerry Ballesteros sent at 1/28/2025  7:58 AM CST -----    ----- Message -----  From: Grazyna Jackson LPN  Sent: 1/28/2025  12:00 AM CST  To: Lisa Randle Staff    ----- Message from Lisa Randle NP sent at 10/25/2024  2:46 PM CDT -----  Cholesterol has increased. Is he taking lipitor daily?  Blood sugar is slightly elevated needs to cut back on sweets. Rest of labs are wnl.        Repeat labs in 3 months.

## 2025-01-28 NOTE — TELEPHONE ENCOUNTER
Spoke to patient that fasting lab is due to recheck cholesterol, gave fasting instructions, orders at [facility]. Pended CMP and Lipid. Looks like he is also due for u/a and TSH for yearly orders... Added those. Please adjust if warranted. He had micro on 2024 annual labs, I added this again but not sure it is needed. Please double check. Don't see htn or dm dx, but he did have elevated blood sugar.

## 2025-02-11 ENCOUNTER — TELEPHONE (OUTPATIENT)
Dept: FAMILY MEDICINE | Facility: CLINIC | Age: 70
End: 2025-02-11
Payer: MEDICARE

## 2025-02-11 NOTE — TELEPHONE ENCOUNTER
Left mess that fasting lab is due to recheck cholesterol, order at [facility], gave fasting instructions, updated remind me.

## 2025-02-19 ENCOUNTER — CLINICAL SUPPORT (OUTPATIENT)
Dept: FAMILY MEDICINE | Facility: CLINIC | Age: 70
End: 2025-02-19
Payer: MEDICARE

## 2025-02-19 DIAGNOSIS — E78.5 HYPERLIPIDEMIA, UNSPECIFIED HYPERLIPIDEMIA TYPE: ICD-10-CM

## 2025-02-19 RX ORDER — PAROXETINE HYDROCHLORIDE HEMIHYDRATE 12.5 MG/1
12.5 TABLET, FILM COATED, EXTENDED RELEASE ORAL EVERY MORNING
Qty: 30 TABLET | Refills: 11 | Status: SHIPPED | OUTPATIENT
Start: 2025-02-19 | End: 2026-02-19

## 2025-02-19 RX ORDER — ATORVASTATIN CALCIUM 20 MG/1
20 TABLET, FILM COATED ORAL DAILY
Qty: 30 TABLET | Refills: 1 | Status: SHIPPED | OUTPATIENT
Start: 2025-02-19

## 2025-02-19 NOTE — PROGRESS NOTES
Pt is here for refills on Atorvastatin and Paroxetine sent to [facility], pt had the bottles.

## 2025-03-14 ENCOUNTER — TELEPHONE (OUTPATIENT)
Dept: FAMILY MEDICINE | Facility: CLINIC | Age: 70
End: 2025-03-14
Payer: MEDICARE